# Patient Record
Sex: MALE | Race: WHITE | NOT HISPANIC OR LATINO
[De-identification: names, ages, dates, MRNs, and addresses within clinical notes are randomized per-mention and may not be internally consistent; named-entity substitution may affect disease eponyms.]

---
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
## 2018-02-12 ENCOUNTER — APPOINTMENT (OUTPATIENT)
Dept: FAMILY MEDICINE | Facility: CLINIC | Age: 18
End: 2018-02-12
Payer: COMMERCIAL

## 2018-02-12 PROCEDURE — 99213 OFFICE O/P EST LOW 20 MIN: CPT

## 2018-03-14 ENCOUNTER — OTHER (OUTPATIENT)
Age: 18
End: 2018-03-14

## 2018-03-23 ENCOUNTER — APPOINTMENT (OUTPATIENT)
Dept: PEDIATRIC ALLERGY IMMUNOLOGY | Facility: CLINIC | Age: 18
End: 2018-03-23
Payer: COMMERCIAL

## 2018-03-23 ENCOUNTER — LABORATORY RESULT (OUTPATIENT)
Age: 18
End: 2018-03-23

## 2018-03-23 VITALS
HEART RATE: 75 BPM | HEIGHT: 65.98 IN | OXYGEN SATURATION: 97 % | BODY MASS INDEX: 18.8 KG/M2 | SYSTOLIC BLOOD PRESSURE: 121 MMHG | DIASTOLIC BLOOD PRESSURE: 73 MMHG | WEIGHT: 117 LBS

## 2018-03-23 DIAGNOSIS — Z82.49 FAMILY HISTORY OF ISCHEMIC HEART DISEASE AND OTHER DISEASES OF THE CIRCULATORY SYSTEM: ICD-10-CM

## 2018-03-23 DIAGNOSIS — Z80.1 FAMILY HISTORY OF MALIGNANT NEOPLASM OF TRACHEA, BRONCHUS AND LUNG: ICD-10-CM

## 2018-03-23 PROCEDURE — 99245 OFF/OP CONSLTJ NEW/EST HI 55: CPT | Mod: 25

## 2018-03-23 PROCEDURE — 36415 COLL VENOUS BLD VENIPUNCTURE: CPT

## 2018-04-02 LAB
25(OH)D3 SERPL-MCNC: 29.7 NG/ML
ALBUMIN SERPL ELPH-MCNC: 4.8 G/DL
ALP BLD-CCNC: 48 U/L
ALT SERPL-CCNC: 14 U/L
ANION GAP SERPL CALC-SCNC: 15 MMOL/L
APPEARANCE: CLEAR
AST SERPL-CCNC: 22 U/L
BASOPHILS # BLD AUTO: 0.02 K/UL
BASOPHILS NFR BLD AUTO: 0.3 %
BILIRUB SERPL-MCNC: 0.4 MG/DL
BILIRUBIN URINE: NEGATIVE
BLOOD URINE: ABNORMAL
BUN SERPL-MCNC: 12 MG/DL
C TRACH RRNA SPEC QL NAA+PROBE: NOT DETECTED
CALCIUM SERPL-MCNC: 9.7 MG/DL
CHLORIDE SERPL-SCNC: 103 MMOL/L
CHOLEST SERPL-MCNC: 145 MG/DL
CHOLEST/HDLC SERPL: 2.7 RATIO
CO2 SERPL-SCNC: 25 MMOL/L
COLOR: YELLOW
CREAT SERPL-MCNC: 0.8 MG/DL
DHEA-SULFATE, SERUM: 204 UG/DL
EOSINOPHIL # BLD AUTO: 0.09 K/UL
EOSINOPHIL NFR BLD AUTO: 1.4 %
ESTRADIOL SERPL-MCNC: 33 PG/ML
FSH SERPL-MCNC: 6.1 IU/L
GLUCOSE QUALITATIVE U: NEGATIVE MG/DL
GLUCOSE SERPL-MCNC: 94 MG/DL
HAV IGG+IGM SER QL: NONREACTIVE
HBA1C MFR BLD HPLC: 5 %
HBV CORE IGG+IGM SER QL: NONREACTIVE
HBV SURFACE AB SERPL IA-ACNC: 6.8 MIU/ML
HBV SURFACE AG SER QL: NONREACTIVE
HCT VFR BLD CALC: 40.6 %
HCV AB SER QL: NONREACTIVE
HCV S/CO RATIO: 0.18 S/CO
HDLC SERPL-MCNC: 54 MG/DL
HGB BLD-MCNC: 13.3 G/DL
HIV1+2 AB SPEC QL IA.RAPID: NONREACTIVE
IMM GRANULOCYTES NFR BLD AUTO: 0.2 %
KETONES URINE: NEGATIVE
LDLC SERPL CALC-MCNC: 82 MG/DL
LEUKOCYTE ESTERASE URINE: NEGATIVE
LH SERPL-ACNC: 5.2 IU/L
LYMPHOCYTES # BLD AUTO: 2.06 K/UL
LYMPHOCYTES NFR BLD AUTO: 32.3 %
MAN DIFF?: NORMAL
MCHC RBC-ENTMCNC: 28.9 PG
MCHC RBC-ENTMCNC: 32.8 GM/DL
MCV RBC AUTO: 88.3 FL
MONOCYTES # BLD AUTO: 0.56 K/UL
MONOCYTES NFR BLD AUTO: 8.8 %
N GONORRHOEA RRNA SPEC QL NAA+PROBE: NOT DETECTED
NEUTROPHILS # BLD AUTO: 3.64 K/UL
NEUTROPHILS NFR BLD AUTO: 57 %
NITRITE URINE: NEGATIVE
PH URINE: 5.5
PLATELET # BLD AUTO: 207 K/UL
POTASSIUM SERPL-SCNC: 4.1 MMOL/L
PROT SERPL-MCNC: 7.7 G/DL
PROTEIN URINE: NEGATIVE MG/DL
RBC # BLD: 4.6 M/UL
RBC # FLD: 14 %
SODIUM SERPL-SCNC: 143 MMOL/L
SOURCE AMPLIFICATION: NORMAL
SPECIFIC GRAVITY URINE: 1.03
T PALLIDUM AB SER QL IA: NEGATIVE
TESTOST SERPL-MCNC: 35.2 NG/DL
TRIGL SERPL-MCNC: 46 MG/DL
TSH SERPL-ACNC: 2.29 UIU/ML
UROBILINOGEN URINE: NEGATIVE MG/DL
WBC # FLD AUTO: 6.38 K/UL

## 2018-04-11 ENCOUNTER — OTHER (OUTPATIENT)
Age: 18
End: 2018-04-11

## 2018-04-23 ENCOUNTER — OTHER (OUTPATIENT)
Age: 18
End: 2018-04-23

## 2018-04-30 ENCOUNTER — OTHER (OUTPATIENT)
Age: 18
End: 2018-04-30

## 2018-05-21 ENCOUNTER — TRANSCRIPTION ENCOUNTER (OUTPATIENT)
Age: 18
End: 2018-05-21

## 2018-05-25 ENCOUNTER — OTHER (OUTPATIENT)
Age: 18
End: 2018-05-25

## 2018-06-07 ENCOUNTER — OTHER (OUTPATIENT)
Age: 18
End: 2018-06-07

## 2018-06-11 ENCOUNTER — APPOINTMENT (OUTPATIENT)
Dept: PEDIATRIC ALLERGY IMMUNOLOGY | Facility: CLINIC | Age: 18
End: 2018-06-11
Payer: COMMERCIAL

## 2018-06-11 VITALS — SYSTOLIC BLOOD PRESSURE: 135 MMHG | DIASTOLIC BLOOD PRESSURE: 75 MMHG | HEART RATE: 90 BPM | WEIGHT: 120.39 LBS

## 2018-06-11 DIAGNOSIS — L70.9 ACNE, UNSPECIFIED: ICD-10-CM

## 2018-06-11 DIAGNOSIS — F39 UNSPECIFIED MOOD [AFFECTIVE] DISORDER: ICD-10-CM

## 2018-06-11 DIAGNOSIS — F32.9 MAJOR DEPRESSIVE DISORDER, SINGLE EPISODE, UNSPECIFIED: ICD-10-CM

## 2018-06-11 PROCEDURE — 99215 OFFICE O/P EST HI 40 MIN: CPT

## 2018-06-11 PROCEDURE — 90792 PSYCH DIAG EVAL W/MED SRVCS: CPT

## 2018-06-18 ENCOUNTER — OTHER (OUTPATIENT)
Age: 18
End: 2018-06-18

## 2018-06-18 ENCOUNTER — APPOINTMENT (OUTPATIENT)
Dept: ENDOCRINOLOGY | Facility: CLINIC | Age: 18
End: 2018-06-18
Payer: COMMERCIAL

## 2018-06-18 VITALS
OXYGEN SATURATION: 100 % | HEART RATE: 112 BPM | RESPIRATION RATE: 17 BRPM | BODY MASS INDEX: 19.99 KG/M2 | DIASTOLIC BLOOD PRESSURE: 60 MMHG | HEIGHT: 65 IN | SYSTOLIC BLOOD PRESSURE: 130 MMHG | WEIGHT: 120 LBS

## 2018-06-18 DIAGNOSIS — Z86.69 PERSONAL HISTORY OF OTHER DISEASES OF THE NERVOUS SYSTEM AND SENSE ORGANS: ICD-10-CM

## 2018-06-18 DIAGNOSIS — Z87.09 PERSONAL HISTORY OF OTHER DISEASES OF THE RESPIRATORY SYSTEM: ICD-10-CM

## 2018-06-18 DIAGNOSIS — F33.3 MAJOR DEPRESSIVE DISORDER, RECURRENT, SEVERE WITH PSYCHOTIC SYMPTOMS: ICD-10-CM

## 2018-06-18 PROCEDURE — 96372 THER/PROPH/DIAG INJ SC/IM: CPT

## 2018-06-18 PROCEDURE — 99205 OFFICE O/P NEW HI 60 MIN: CPT | Mod: 25

## 2018-06-18 RX ORDER — ARIPIPRAZOLE 5 MG/1
5 TABLET ORAL
Refills: 2 | Status: ACTIVE | COMMUNITY
Start: 2017-07-03

## 2018-06-25 ENCOUNTER — OTHER (OUTPATIENT)
Age: 18
End: 2018-06-25

## 2018-06-28 PROBLEM — L70.9 ACNE: Status: ACTIVE | Noted: 2018-06-28

## 2018-06-29 ENCOUNTER — OTHER (OUTPATIENT)
Age: 18
End: 2018-06-29

## 2018-07-02 ENCOUNTER — RX RENEWAL (OUTPATIENT)
Age: 18
End: 2018-07-02

## 2018-07-02 ENCOUNTER — APPOINTMENT (OUTPATIENT)
Dept: ENDOCRINOLOGY | Facility: CLINIC | Age: 18
End: 2018-07-02
Payer: COMMERCIAL

## 2018-07-02 PROCEDURE — 96372 THER/PROPH/DIAG INJ SC/IM: CPT

## 2018-07-02 RX ORDER — TESTOSTERONE CYPIONATE 200 MG/ML
200 INJECTION, SOLUTION INTRAMUSCULAR
Refills: 0 | Status: COMPLETED | COMMUNITY
Start: 2018-06-18 | End: 2018-07-02

## 2018-07-02 RX ORDER — TESTOSTERONE ENANTHATE 200 MG/ML
200 INJECTION, SOLUTION INTRAMUSCULAR
Refills: 0 | Status: COMPLETED | OUTPATIENT
Start: 2018-07-02

## 2018-07-02 RX ADMIN — TESTOSTERONE ENANTHATE 0 MG/ML: 200 INJECTION, SOLUTION INTRAMUSCULAR at 00:00

## 2018-07-12 ENCOUNTER — APPOINTMENT (OUTPATIENT)
Dept: FAMILY MEDICINE | Facility: CLINIC | Age: 18
End: 2018-07-12
Payer: COMMERCIAL

## 2018-07-12 VITALS
DIASTOLIC BLOOD PRESSURE: 70 MMHG | WEIGHT: 120 LBS | SYSTOLIC BLOOD PRESSURE: 120 MMHG | BODY MASS INDEX: 19.99 KG/M2 | HEIGHT: 65 IN

## 2018-07-12 DIAGNOSIS — H61.23 IMPACTED CERUMEN, BILATERAL: ICD-10-CM

## 2018-07-12 PROCEDURE — 99213 OFFICE O/P EST LOW 20 MIN: CPT

## 2018-07-24 ENCOUNTER — APPOINTMENT (OUTPATIENT)
Dept: ENDOCRINOLOGY | Facility: CLINIC | Age: 18
End: 2018-07-24
Payer: COMMERCIAL

## 2018-07-24 ENCOUNTER — LABORATORY RESULT (OUTPATIENT)
Age: 18
End: 2018-07-24

## 2018-07-24 VITALS
DIASTOLIC BLOOD PRESSURE: 60 MMHG | BODY MASS INDEX: 19.99 KG/M2 | HEART RATE: 84 BPM | WEIGHT: 120 LBS | SYSTOLIC BLOOD PRESSURE: 110 MMHG | HEIGHT: 65 IN | RESPIRATION RATE: 16 BRPM | OXYGEN SATURATION: 100 %

## 2018-07-24 PROCEDURE — 99214 OFFICE O/P EST MOD 30 MIN: CPT

## 2018-07-26 ENCOUNTER — RX RENEWAL (OUTPATIENT)
Age: 18
End: 2018-07-26

## 2018-08-07 LAB
ALBUMIN SERPL ELPH-MCNC: 4.7 G/DL
ALP BLD-CCNC: 48 U/L
ALT SERPL-CCNC: 12 U/L
ANION GAP SERPL CALC-SCNC: 11 MMOL/L
AST SERPL-CCNC: 19 U/L
BASOPHILS # BLD AUTO: 0.03 K/UL
BASOPHILS NFR BLD AUTO: 0.6 %
BILIRUB SERPL-MCNC: 0.5 MG/DL
BUN SERPL-MCNC: 9 MG/DL
CALCIUM SERPL-MCNC: 9.4 MG/DL
CHLORIDE SERPL-SCNC: 103 MMOL/L
CO2 SERPL-SCNC: 29 MMOL/L
CREAT SERPL-MCNC: 0.74 MG/DL
EOSINOPHIL # BLD AUTO: 0.1 K/UL
EOSINOPHIL NFR BLD AUTO: 1.9 %
ESTRADIOL SERPL-MCNC: 39 PG/ML
GLUCOSE SERPL-MCNC: 90 MG/DL
HCT VFR BLD CALC: 42.6 %
HGB BLD-MCNC: 13.1 G/DL
IMM GRANULOCYTES NFR BLD AUTO: 0 %
LYMPHOCYTES # BLD AUTO: 1.87 K/UL
LYMPHOCYTES NFR BLD AUTO: 35.1 %
MAN DIFF?: NORMAL
MCHC RBC-ENTMCNC: 28 PG
MCHC RBC-ENTMCNC: 30.8 GM/DL
MCV RBC AUTO: 91 FL
MONOCYTES # BLD AUTO: 0.6 K/UL
MONOCYTES NFR BLD AUTO: 11.3 %
NEUTROPHILS # BLD AUTO: 2.73 K/UL
NEUTROPHILS NFR BLD AUTO: 51.1 %
PLATELET # BLD AUTO: 238 K/UL
POTASSIUM SERPL-SCNC: 4.7 MMOL/L
PROT SERPL-MCNC: 7.5 G/DL
RBC # BLD: 4.68 M/UL
RBC # FLD: 13.8 %
SODIUM SERPL-SCNC: 143 MMOL/L
TESTOST SERPL-MCNC: 476.5 NG/DL
WBC # FLD AUTO: 5.33 K/UL

## 2018-08-31 ENCOUNTER — MEDICATION RENEWAL (OUTPATIENT)
Age: 18
End: 2018-08-31

## 2018-09-06 ENCOUNTER — APPOINTMENT (OUTPATIENT)
Dept: PLASTIC SURGERY | Facility: CLINIC | Age: 18
End: 2018-09-06
Payer: COMMERCIAL

## 2018-09-06 VITALS
DIASTOLIC BLOOD PRESSURE: 72 MMHG | OXYGEN SATURATION: 99 % | HEART RATE: 63 BPM | BODY MASS INDEX: 20.83 KG/M2 | HEIGHT: 65 IN | WEIGHT: 125 LBS | RESPIRATION RATE: 16 BRPM | SYSTOLIC BLOOD PRESSURE: 113 MMHG

## 2018-09-06 PROCEDURE — 99204 OFFICE O/P NEW MOD 45 MIN: CPT

## 2018-09-13 ENCOUNTER — OTHER (OUTPATIENT)
Age: 18
End: 2018-09-13

## 2018-09-19 ENCOUNTER — OTHER (OUTPATIENT)
Age: 18
End: 2018-09-19

## 2018-09-19 NOTE — ED PROVIDER NOTE - NSCAREINITIATED _GEN_ER
HPI     Last Eye Exam: 2017 Dr. Philippe at [facility] (no longer   open).    Annual Eyemed Vision exam and CLfit  Blur at distance uncorrected.  Declined dilation due to pregnancy.  Had trial of rachid didn't like  Most brands of CLs uncomfortable in right eye    SYMPTOMS:  (--) Eye pain/discomfort  (--) Blurry Vision  (--) Double Vision  (--) Itchy Eyes  (+) Watery Eyes: only around scented candles.  (+) Dry Eyes: od only  (+) Floaters/Black Spots: for years.  (+) Headaches: neck or right temporal side of head.  (+) Photophobia  ---------------------------------------------------------------------------    EYE MEDS:  otc drops for dry eye relief prn od only  ---------------------------------------------------------------------------    LENSES:  Glasses: SVLs  Contacts: Daily lenses      Last edited by Braydon Rodriguez, OD on 9/19/2018  9:44 AM. (History)            Assessment /Plan     For exam results, see Encounter Report.    Myopia of both eyes  Eyeglass Final Rx     Eyeglass Final Rx       Sphere Cylinder [place] [facility]    Right -2.00 +0.50 040 20/20    Left -1.75 +0.75 110 20/20    Type:  SVL    Expiration Date:  9/20/2019              Trial #2 with 1 wk PHREV    RTC 1 yr                  Bryon Winston(NP)

## 2018-09-24 ENCOUNTER — CHART COPY (OUTPATIENT)
Age: 18
End: 2018-09-24

## 2018-09-25 ENCOUNTER — APPOINTMENT (OUTPATIENT)
Dept: ENDOCRINOLOGY | Facility: CLINIC | Age: 18
End: 2018-09-25
Payer: COMMERCIAL

## 2018-09-25 VITALS
OXYGEN SATURATION: 98 % | SYSTOLIC BLOOD PRESSURE: 120 MMHG | HEIGHT: 65 IN | HEART RATE: 68 BPM | BODY MASS INDEX: 20.83 KG/M2 | WEIGHT: 125 LBS | DIASTOLIC BLOOD PRESSURE: 80 MMHG

## 2018-09-25 PROCEDURE — 99214 OFFICE O/P EST MOD 30 MIN: CPT

## 2018-09-25 RX ORDER — PAROXETINE HYDROCHLORIDE 20 MG/1
20 TABLET, FILM COATED ORAL DAILY
Qty: 30 | Refills: 0 | Status: COMPLETED | COMMUNITY
Start: 2017-07-22 | End: 2018-09-25

## 2018-09-25 RX ORDER — TESTOSTERONE CYPIONATE 100 MG/ML
100 INJECTION, SOLUTION INTRAMUSCULAR
Refills: 0 | Status: COMPLETED | COMMUNITY
End: 2018-09-25

## 2018-09-25 RX ORDER — ARIPIPRAZOLE 5 MG/1
5 TABLET ORAL
Refills: 0 | Status: COMPLETED | COMMUNITY
End: 2018-09-25

## 2018-09-27 ENCOUNTER — OTHER (OUTPATIENT)
Age: 18
End: 2018-09-27

## 2018-10-03 ENCOUNTER — MEDICATION RENEWAL (OUTPATIENT)
Age: 18
End: 2018-10-03

## 2018-10-10 ENCOUNTER — OTHER (OUTPATIENT)
Age: 18
End: 2018-10-10

## 2018-10-12 ENCOUNTER — OTHER (OUTPATIENT)
Age: 18
End: 2018-10-12

## 2018-10-15 ENCOUNTER — OTHER (OUTPATIENT)
Age: 18
End: 2018-10-15

## 2018-10-23 ENCOUNTER — OTHER (OUTPATIENT)
Age: 18
End: 2018-10-23

## 2018-10-29 ENCOUNTER — OTHER (OUTPATIENT)
Age: 18
End: 2018-10-29

## 2018-11-15 ENCOUNTER — APPOINTMENT (OUTPATIENT)
Dept: PLASTIC SURGERY | Facility: CLINIC | Age: 18
End: 2018-11-15
Payer: COMMERCIAL

## 2018-11-15 VITALS
SYSTOLIC BLOOD PRESSURE: 94 MMHG | BODY MASS INDEX: 21.33 KG/M2 | HEART RATE: 74 BPM | HEIGHT: 65 IN | RESPIRATION RATE: 16 BRPM | DIASTOLIC BLOOD PRESSURE: 61 MMHG | OXYGEN SATURATION: 100 % | WEIGHT: 128 LBS

## 2018-11-15 PROCEDURE — 99214 OFFICE O/P EST MOD 30 MIN: CPT

## 2018-11-27 ENCOUNTER — OTHER (OUTPATIENT)
Age: 18
End: 2018-11-27

## 2018-11-29 ENCOUNTER — OUTPATIENT (OUTPATIENT)
Dept: OUTPATIENT SERVICES | Facility: HOSPITAL | Age: 18
LOS: 1 days | End: 2018-11-29

## 2018-11-29 ENCOUNTER — APPOINTMENT (OUTPATIENT)
Dept: SURGICAL ONCOLOGY | Facility: CLINIC | Age: 18
End: 2018-11-29
Payer: COMMERCIAL

## 2018-11-29 VITALS
SYSTOLIC BLOOD PRESSURE: 102 MMHG | OXYGEN SATURATION: 98 % | TEMPERATURE: 98 F | WEIGHT: 128.09 LBS | DIASTOLIC BLOOD PRESSURE: 64 MMHG | HEIGHT: 66.5 IN | RESPIRATION RATE: 14 BRPM | HEART RATE: 89 BPM

## 2018-11-29 DIAGNOSIS — F64.0 TRANSSEXUALISM: ICD-10-CM

## 2018-11-29 DIAGNOSIS — F20.9 SCHIZOPHRENIA, UNSPECIFIED: ICD-10-CM

## 2018-11-29 DIAGNOSIS — F32.9 MAJOR DEPRESSIVE DISORDER, SINGLE EPISODE, UNSPECIFIED: ICD-10-CM

## 2018-11-29 DIAGNOSIS — N63.0 UNSPECIFIED LUMP IN UNSPECIFIED BREAST: ICD-10-CM

## 2018-11-29 LAB
HCG SERPL-ACNC: < 5 MIU/ML — SIGNIFICANT CHANGE UP
HCT VFR BLD CALC: 44.8 % — SIGNIFICANT CHANGE UP (ref 34.5–45)
HGB BLD-MCNC: 14.1 G/DL — SIGNIFICANT CHANGE UP (ref 11.5–15.5)
MCHC RBC-ENTMCNC: 26.6 PG — LOW (ref 27–34)
MCHC RBC-ENTMCNC: 31.5 % — LOW (ref 32–36)
MCV RBC AUTO: 84.4 FL — SIGNIFICANT CHANGE UP (ref 80–100)
NRBC # FLD: 0 — SIGNIFICANT CHANGE UP
PLATELET # BLD AUTO: 219 K/UL — SIGNIFICANT CHANGE UP (ref 150–400)
PMV BLD: 10.3 FL — SIGNIFICANT CHANGE UP (ref 7–13)
RBC # BLD: 5.31 M/UL — HIGH (ref 3.8–5.2)
RBC # FLD: 14.6 % — HIGH (ref 10.3–14.5)
WBC # BLD: 4.97 K/UL — SIGNIFICANT CHANGE UP (ref 3.8–10.5)
WBC # FLD AUTO: 4.97 K/UL — SIGNIFICANT CHANGE UP (ref 3.8–10.5)

## 2018-11-29 PROCEDURE — 99243 OFF/OP CNSLTJ NEW/EST LOW 30: CPT

## 2018-11-29 NOTE — H&P PST ADULT - MUSCULOSKELETAL
detailed exam no joint swelling/ROM intact/no joint warmth/no calf tenderness/normal strength/no joint erythema negative

## 2018-11-29 NOTE — H&P PST ADULT - HISTORY OF PRESENT ILLNESS
16yo female with medical h/o Schizophrenic disorder, Depression, and Gender identity disorder, . Pt presents today for presurgical testing for Bilateral Mastectomy, Bilateral Nipple Reconstruction with Liposuction scheduled for 12/19/2018.

## 2018-11-29 NOTE — H&P PST ADULT - PMH
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

## 2018-11-29 NOTE — H&P PST ADULT - NSANTHOSAYNRD_GEN_A_CORE
No. JANNY screening performed.  STOP BANG Legend: 0-2 = LOW Risk; 3-4 = INTERMEDIATE Risk; 5-8 = HIGH Risk

## 2018-11-29 NOTE — H&P PST ADULT - ATTENDING COMMENTS
Patient met in the pre-operative holding area, with mother and father present.  Operative plan reviewed: bilateral breast liposuction, mastectomy, and nipple repositioning.  Risks, benefits, and alternatives were discussed including the risks of infection, bleeding, seroma, hematoma, nipple necrosis, asymmetry, need for revision surgery, loss of nipple sensation, decrease/loss of sensation of breast skin, dehiscence.  Patient and family understand the risks, benefits, and alterniatives, all questions answered, patient wishes to proceed with surgery.  Informed consent obtained and placed on the chart.

## 2018-11-29 NOTE — H&P PST ADULT - PROBLEM SELECTOR PLAN 1
Bilateral Mastectomy, Bilateral Nipple Reconstruction with Liposuction scheduled for 12/19/2018. Bilateral Mastectomy, Bilateral Nipple Reconstruction with Liposuction scheduled for 12/19/2018.  Pre-op instructions given. Pt verbalized understanding  Pepcid given for GI prophylaxis  Chlorhexidine wash instructions given  UCG ordered STAT for day of procedure - urine container given

## 2018-12-17 ENCOUNTER — OTHER (OUTPATIENT)
Age: 18
End: 2018-12-17

## 2018-12-17 ENCOUNTER — APPOINTMENT (OUTPATIENT)
Dept: ENDOCRINOLOGY | Facility: CLINIC | Age: 18
End: 2018-12-17
Payer: COMMERCIAL

## 2018-12-17 VITALS
HEART RATE: 74 BPM | WEIGHT: 128 LBS | OXYGEN SATURATION: 99 % | DIASTOLIC BLOOD PRESSURE: 60 MMHG | SYSTOLIC BLOOD PRESSURE: 98 MMHG

## 2018-12-17 PROBLEM — F32.9 MAJOR DEPRESSIVE DISORDER, SINGLE EPISODE, UNSPECIFIED: Chronic | Status: ACTIVE | Noted: 2018-11-29

## 2018-12-17 PROBLEM — F64.0 TRANSSEXUALISM: Chronic | Status: ACTIVE | Noted: 2018-11-29

## 2018-12-17 PROBLEM — F20.9 SCHIZOPHRENIA, UNSPECIFIED: Chronic | Status: ACTIVE | Noted: 2018-11-29

## 2018-12-17 PROCEDURE — 99214 OFFICE O/P EST MOD 30 MIN: CPT

## 2018-12-18 ENCOUNTER — TRANSCRIPTION ENCOUNTER (OUTPATIENT)
Age: 18
End: 2018-12-18

## 2018-12-18 ENCOUNTER — APPOINTMENT (OUTPATIENT)
Dept: PLASTIC SURGERY | Facility: CLINIC | Age: 18
End: 2018-12-18

## 2018-12-19 ENCOUNTER — RESULT REVIEW (OUTPATIENT)
Age: 18
End: 2018-12-19

## 2018-12-19 ENCOUNTER — OUTPATIENT (OUTPATIENT)
Dept: OUTPATIENT SERVICES | Facility: HOSPITAL | Age: 18
LOS: 1 days | Discharge: HOME | End: 2018-12-19
Payer: COMMERCIAL

## 2018-12-19 VITALS
SYSTOLIC BLOOD PRESSURE: 125 MMHG | OXYGEN SATURATION: 99 % | HEART RATE: 87 BPM | RESPIRATION RATE: 14 BRPM | TEMPERATURE: 98 F | DIASTOLIC BLOOD PRESSURE: 82 MMHG

## 2018-12-19 VITALS
TEMPERATURE: 98 F | OXYGEN SATURATION: 100 % | HEART RATE: 103 BPM | RESPIRATION RATE: 14 BRPM | DIASTOLIC BLOOD PRESSURE: 62 MMHG | HEIGHT: 66.5 IN | WEIGHT: 128.09 LBS | SYSTOLIC BLOOD PRESSURE: 121 MMHG

## 2018-12-19 DIAGNOSIS — F64.0 TRANSSEXUALISM: ICD-10-CM

## 2018-12-19 PROCEDURE — 88307 TISSUE EXAM BY PATHOLOGIST: CPT | Mod: 26

## 2018-12-19 PROCEDURE — 19303 MAST SIMPLE COMPLETE: CPT | Mod: 50

## 2018-12-19 PROCEDURE — 15877 SUCTION LIPECTOMY TRUNK: CPT

## 2018-12-19 PROCEDURE — 19350 NIPPLE/AREOLA RECONSTRUCTION: CPT | Mod: 50

## 2018-12-19 RX ORDER — OXYCODONE HYDROCHLORIDE 5 MG/1
1 TABLET ORAL
Qty: 20 | Refills: 0
Start: 2018-12-19 | End: 2018-12-22

## 2018-12-19 NOTE — ASU DISCHARGE PLAN (ADULT/PEDIATRIC). - NOTIFY
Fever greater than 101/Unable to Urinate/Pain not relieved by Medications/Bleeding that does not stop/Swelling that continues/Persistent Nausea and Vomiting

## 2018-12-19 NOTE — ASU DISCHARGE PLAN (ADULT/PEDIATRIC). - MEDICATION SUMMARY - MEDICATIONS TO TAKE
I will START or STAY ON the medications listed below when I get home from the hospital:    oxyCODONE 5 mg oral tablet  -- 1 tab(s) by mouth every 4-6 hours, As needed, for breakthrough pain Moderate Pain (4 - 6) MDD 6  -- Indication: For Pain    Paxil 20 mg oral tablet  -- 1 tab(s) by mouth once a day  -- Indication: For Home medication    Abilify  -- 7.5mg   -- Indication: For Home medication    testosterone  -- every 2 weeks  -- Indication: For Home medication

## 2018-12-19 NOTE — BRIEF OPERATIVE NOTE - PROCEDURE
<<-----Click on this checkbox to enter Procedure Nipple reconstruction  12/19/2018    Active  TCOOK4  Liposuction  12/19/2018    Active  TCOOK4  Bilateral mastectomy  12/19/2018    Active  TCOOK4

## 2018-12-19 NOTE — ASU DISCHARGE PLAN (ADULT/PEDIATRIC). - SPECIAL INSTRUCTIONS
WOUND CARE. Keep chest binder on for compression support. You have steri strips around your nipples, these will fall off on their own or be removed in office at your follow up appointment. You will be discharged with ERWIN drains. You will need to empty them and record outputs accurately. This will be taught to you by the nursing staff. Please do not remove the ERWIN drains. They will be removed in the office. Please bring to the office accurate records of output.     Please do not soak in baths/bodies of water until your incisions are healed. You may shower, let water run over incisions. Pat dry, and replace binder back on for support.

## 2018-12-19 NOTE — ASU DISCHARGE PLAN (ADULT/PEDIATRIC). - ITEMS TO FOLLOWUP WITH YOUR PHYSICIAN'S
Please follow up with Dr. Pena within x1 week after discharge from the hospital. You may call [phone] to schedule an appointment.   For pain, please take Tylenol and Motrin, a prescription was sent to the pharmacy for Oxycodone, you can take these as prescribed for breakthrough pain.

## 2018-12-19 NOTE — BRIEF OPERATIVE NOTE - PRE-OP DX
Encounter for breast reconstruction following mastectomy  12/19/2018  Elected mastectomy with nipple reconstruction and liposuction  Active  Ivy Walters

## 2018-12-27 ENCOUNTER — APPOINTMENT (OUTPATIENT)
Dept: PLASTIC SURGERY | Facility: CLINIC | Age: 18
End: 2018-12-27

## 2018-12-27 VITALS
SYSTOLIC BLOOD PRESSURE: 111 MMHG | WEIGHT: 128 LBS | OXYGEN SATURATION: 100 % | RESPIRATION RATE: 16 BRPM | HEART RATE: 109 BPM | BODY MASS INDEX: 21.33 KG/M2 | DIASTOLIC BLOOD PRESSURE: 78 MMHG | HEIGHT: 65 IN

## 2018-12-28 LAB — SURGICAL PATHOLOGY STUDY: SIGNIFICANT CHANGE UP

## 2019-01-08 ENCOUNTER — APPOINTMENT (OUTPATIENT)
Dept: PLASTIC SURGERY | Facility: CLINIC | Age: 19
End: 2019-01-08
Payer: COMMERCIAL

## 2019-01-08 VITALS
OXYGEN SATURATION: 100 % | TEMPERATURE: 97.8 F | BODY MASS INDEX: 20.99 KG/M2 | DIASTOLIC BLOOD PRESSURE: 73 MMHG | HEIGHT: 65 IN | RESPIRATION RATE: 16 BRPM | HEART RATE: 82 BPM | SYSTOLIC BLOOD PRESSURE: 125 MMHG | WEIGHT: 126 LBS

## 2019-01-08 PROCEDURE — 99024 POSTOP FOLLOW-UP VISIT: CPT

## 2019-01-17 ENCOUNTER — APPOINTMENT (OUTPATIENT)
Dept: PLASTIC SURGERY | Facility: CLINIC | Age: 19
End: 2019-01-17

## 2019-01-17 VITALS
SYSTOLIC BLOOD PRESSURE: 114 MMHG | HEART RATE: 72 BPM | RESPIRATION RATE: 16 BRPM | OXYGEN SATURATION: 99 % | WEIGHT: 126 LBS | HEIGHT: 65 IN | DIASTOLIC BLOOD PRESSURE: 72 MMHG | BODY MASS INDEX: 20.99 KG/M2

## 2019-01-17 NOTE — HISTORY OF PRESENT ILLNESS
[FreeTextEntry1] : 18 year old transgender male presents s/p top surgery on 12/19/18, presents with a compression binder.\par \par He reports his wounds are slowly healing.

## 2019-01-17 NOTE — ADDENDUM
[FreeTextEntry1] : I, Rohini Malhotra, acted solely as a scribe for Dr. Yan Pena on this date 1/17/19.

## 2019-01-17 NOTE — PHYSICAL EXAM
[NI] : Normal [de-identified] : Bilateral incisions healing well, no evidence of infection. Wound closing slowly. Wet to dry dressing applied.\par Nipples in good position.\par HB was present during exam.

## 2019-01-24 ENCOUNTER — APPOINTMENT (OUTPATIENT)
Dept: PLASTIC SURGERY | Facility: CLINIC | Age: 19
End: 2019-01-24

## 2019-01-24 VITALS
HEIGHT: 65 IN | SYSTOLIC BLOOD PRESSURE: 123 MMHG | DIASTOLIC BLOOD PRESSURE: 79 MMHG | WEIGHT: 128 LBS | BODY MASS INDEX: 21.33 KG/M2 | HEART RATE: 95 BPM | OXYGEN SATURATION: 99 %

## 2019-01-24 NOTE — ADDENDUM
[FreeTextEntry1] : I, Rohini Malhotra, acted solely as a scribe for Dr. Yan Pena on this date 1/24/19.

## 2019-01-24 NOTE — PHYSICAL EXAM
[NI] : Normal [de-identified] : Bilateral incisions healing well, no evidence of infection. Wound closing slowly. Antibiotic ointment with bandage applied.\par Nipples in good position.\par HB was present during exam.

## 2019-02-14 ENCOUNTER — RX RENEWAL (OUTPATIENT)
Age: 19
End: 2019-02-14

## 2019-02-21 ENCOUNTER — APPOINTMENT (OUTPATIENT)
Dept: PLASTIC SURGERY | Facility: CLINIC | Age: 19
End: 2019-02-21

## 2019-02-21 VITALS
WEIGHT: 125 LBS | HEIGHT: 65 IN | HEART RATE: 76 BPM | BODY MASS INDEX: 20.83 KG/M2 | SYSTOLIC BLOOD PRESSURE: 114 MMHG | RESPIRATION RATE: 17 BRPM | DIASTOLIC BLOOD PRESSURE: 65 MMHG

## 2019-02-21 NOTE — ASSESSMENT
[FreeTextEntry1] : 17 y/o transgender male s/p top surgery on 12/19/18. The patient is doing well. Scar massage technique was demonstrated to the patient and recommended for 5 minutes BID q 6 weeks to smooth out his scars. Return in six months for long term follow up.

## 2019-02-21 NOTE — ADDENDUM
[FreeTextEntry1] : I, Rohini Malhotra, acted solely as a scribe for Dr. Yan Pena on this date 2/21/19.

## 2019-02-21 NOTE — HISTORY OF PRESENT ILLNESS
[FreeTextEntry1] : 18 year old transgender male presents s/p top surgery on 12/19/18.\par \par He reports mild dehiscence in his left upper areolar scar. He denies drainage from the site.

## 2019-03-11 ENCOUNTER — RX RENEWAL (OUTPATIENT)
Age: 19
End: 2019-03-11

## 2019-03-15 ENCOUNTER — APPOINTMENT (OUTPATIENT)
Dept: FAMILY MEDICINE | Facility: CLINIC | Age: 19
End: 2019-03-15
Payer: COMMERCIAL

## 2019-03-15 VITALS
DIASTOLIC BLOOD PRESSURE: 80 MMHG | RESPIRATION RATE: 16 BRPM | WEIGHT: 128 LBS | OXYGEN SATURATION: 98 % | HEART RATE: 81 BPM | SYSTOLIC BLOOD PRESSURE: 120 MMHG | HEIGHT: 65 IN | BODY MASS INDEX: 21.33 KG/M2

## 2019-03-15 DIAGNOSIS — Z87.892 PERSONAL HISTORY OF ANAPHYLAXIS: ICD-10-CM

## 2019-03-15 PROCEDURE — 99213 OFFICE O/P EST LOW 20 MIN: CPT

## 2019-03-15 RX ORDER — ARIPIPRAZOLE 5 MG/1
5 TABLET ORAL
Refills: 0 | Status: DISCONTINUED | COMMUNITY
End: 2019-03-15

## 2019-03-15 NOTE — PLAN
[FreeTextEntry1] : Recent labwork performed at specialist.  \par Epipen renewed.  \par \par Asked patient to have recent notes from Psychiatrist sent to office. \par \par Patient expressed understanding. \par \par

## 2019-03-15 NOTE — HISTORY OF PRESENT ILLNESS
[Parent] : parent [FreeTextEntry1] : Here for follow-up  [de-identified] : Here for follow-up, requires refill on epi pen for severe sesame and seed allergy.  Has not had allergic reaction since age 14, required hospital eval at that time. \par \par Just had bloodwork performed with endocrine at end of January, CBC, CMP reviewed.  \par Follows with Endocrine, Psych every 2 months, Psychology weekly, Plastic Surgery.  Has upcoming endocrinology appointment, following testosterone levels. \par \par Paxil dosage changed to 15mg two months ago.\par \par Medications and allergies reviewed. \par Patient reports doing well.   \par

## 2019-03-15 NOTE — PHYSICAL EXAM
[No Acute Distress] : no acute distress [Well Nourished] : well nourished [Well Developed] : well developed [Well-Appearing] : well-appearing [Normal Oropharynx] : the oropharynx was normal [Normal TMs] : both tympanic membranes were normal [Supple] : supple [No Lymphadenopathy] : no lymphadenopathy [No Respiratory Distress] : no respiratory distress  [Clear to Auscultation] : lungs were clear to auscultation bilaterally [No Accessory Muscle Use] : no accessory muscle use [Normal Rate] : normal rate  [Regular Rhythm] : with a regular rhythm [Normal S1, S2] : normal S1 and S2 [No Edema] : there was no peripheral edema [No Extremity Clubbing/Cyanosis] : no extremity clubbing/cyanosis [Soft] : abdomen soft [Non Tender] : non-tender [Non-distended] : non-distended [No Masses] : no abdominal mass palpated [Normal Bowel Sounds] : normal bowel sounds [Normal Affect] : the affect was normal [Alert and Oriented x3] : oriented to person, place, and time [Normal Mood] : the mood was normal

## 2019-04-18 ENCOUNTER — TRANSCRIPTION ENCOUNTER (OUTPATIENT)
Age: 19
End: 2019-04-18

## 2019-04-26 ENCOUNTER — APPOINTMENT (OUTPATIENT)
Dept: OBGYN | Facility: CLINIC | Age: 19
End: 2019-04-26
Payer: COMMERCIAL

## 2019-04-26 VITALS
SYSTOLIC BLOOD PRESSURE: 122 MMHG | HEIGHT: 65 IN | BODY MASS INDEX: 21.58 KG/M2 | HEART RATE: 74 BPM | WEIGHT: 129.5 LBS | OXYGEN SATURATION: 100 % | DIASTOLIC BLOOD PRESSURE: 79 MMHG

## 2019-04-26 PROCEDURE — 99202 OFFICE O/P NEW SF 15 MIN: CPT

## 2019-04-29 NOTE — PHYSICAL EXAM
[Alert] : alert [Awake] : awake [Oriented x3] : oriented to person, place, and time [Acute Distress] : no acute distress [Depressed Mood] : not depressed [Flat Affect] : affect not flat

## 2019-05-06 ENCOUNTER — APPOINTMENT (OUTPATIENT)
Dept: ENDOCRINOLOGY | Facility: CLINIC | Age: 19
End: 2019-05-06
Payer: COMMERCIAL

## 2019-05-06 VITALS
SYSTOLIC BLOOD PRESSURE: 120 MMHG | HEART RATE: 70 BPM | WEIGHT: 128 LBS | HEIGHT: 65 IN | DIASTOLIC BLOOD PRESSURE: 78 MMHG | OXYGEN SATURATION: 98 % | BODY MASS INDEX: 21.33 KG/M2

## 2019-05-06 PROCEDURE — 99214 OFFICE O/P EST MOD 30 MIN: CPT | Mod: GC

## 2019-05-06 NOTE — REVIEW OF SYSTEMS
[Depression] : depression [All other systems negative] : All other systems negative [Recent Weight Gain (___ Lbs)] : no recent weight gain [Fever] : no fever [Recent Weight Loss (___ Lbs)] : no recent weight loss [Chills] : no chills [Dysphonia] : no dysphonia [Dysphagia] : no dysphagia [Blurry Vision] : no blurred vision [Palpitations] : no palpitations [Chest Pain] : no chest pain [Neck Pain] : no neck pain [Lower Ext Edema] : no lower extremity edema [Cough] : no cough [Nausea] : no nausea [Shortness Of Breath] : no shortness of breath [Vomiting] : no vomiting was observed [Constipation] : no constipation [Diarrhea] : no diarrhea [Joint Pain] : no joint pain [Abdominal Pain] : no abdominal pain [Myalgia] : no myalgia  [Anxiety] : no anxiety [Tremors] : no tremors [Headache] : no headaches

## 2019-05-06 NOTE — ASSESSMENT
[FreeTextEntry1] : 19 y/o trans M with gender dysphoria here for hormone male transition- Discussed the treatment regimen of testosterone and risks and benefits. He is aware of risk of polycythemia, CVA, CAD, liver effects, and worsening underlying psychiatric disease possibly with testosterone use. Also discussed benefits such as facial and body hair growth, body fat redistribution, lack of periods, clitorimegaly, deeper voice etc. He is aware the effects can take anywhere from 3-6 months for onset and 2 years for maximum effect. He is satisfied thus far with the effects of testosterone therapy. Now on testosterone 120 mg IM every 2 weeks. Last injection 14 days ago. Will check trough testosterone level today and estradiol level as well and adjust testosterone as needed for goal trough testosterone of 300. He will consider testosterone pellets in the future. Now s/p top surgery with Dr. Pena. Not interested in bottom surgery as of yet, has seen GYN. Will need continued monitoring for cancer screening in the future such as mammography, colonoscopy, and PAP if no TERRELL. No plans for pregnancy. He does not want to entertain egg freezing or fertility options. Mental health follow-up recommended.

## 2019-05-06 NOTE — HISTORY OF PRESENT ILLNESS
[FreeTextEntry1] : PAVITHRA HOWARD is a 18 year old, transmale here for transgender hormone management. \par \par Pronouns: He/ Him / His \par Sexual orientation: mostly straight\par Gender identity: Male\par \par Transition history (Social, Endo, Surgical): When the patient was little, he felt he was a boy. Hated dolls and girls stuff, wanted a penis when he was [age] years old. Interested in doing all masculine things. Identical twin sister (Twin A) is supportive. Always liked and done different things. Sister supportive of the patient's transition. When he was in the women's restroom since starting college realized that he didn't want to be a women any longer. Told older sister months before coming out that he was a delmi. Spoke with parents about coming out first. When he told his mom (early Dec 2017), she intially shut down the conversation and her dad didn't understand (Nov 2017), but they let her come out publically and have "come around' and are supportive. Since then, they are supportive, and call the patient by "Pavithra" but are still mis-gendering the patient with pronouns. Came out publically in early Dec 2017.\par \par Started on testosterone 6/18/18 100 mg every 2 weeks. Now on testosterone 120 mg every 14 days as he was still having menses. Last injection 14 days ago, due for injection today. Injects into buttocks. No issues with injections. Since starting testosterone, has noticed voice changes (deeper voice), bottom growth, some increased hair growth but not as much as he would like, clitoromegaly. Has increased libido. Has also had worsening acne, especially over the shoulders and upper arms. Has been using clindamycin cream. He states that he would like more hair growth and a deeper voice. He has had dysphoria about chest, and has now undergone top surgery 12/2018 periareolar with Dr. Pena and is satisfied with the results. Has seen gyn but deferring hysterectomy at this time and not interested in egg freezing. LMP 3/16/2019. Menses: periods had been regular prior to initiation of testosterone. At this time, he does not have headaches, blurry vision, chest pain, palpitations, abdominal pain, nausea, vomiting, diarrhea, constipation, leg/calf pain, lower extremity edema. \par \par Education: Pt attends [employer], studying music/performance. High school: [employer]. College: [employer], [employer] (composition and tech)\par Coming out/Family support: Mother is supportive, dad is as well.\par  Existing provider team: - GYN: saw Dr. Escobar. - Psychiatrist: Dr. Edwin Bran ([facility] ) Pt has seen provider since June 2017, sessions are monthly. Happy to write letter. - Psychologist: Dr. Sri Quintero ( [facility] ) Pt has seen provider since May 2017, sessions are weekly. Provider is more on board with pt's transition than psychiatrist, according to pt's mother. Pt has more a relationship with Dr. Quintero. - PCP: Gen Anthony Fraire\par Hospital admission: 13 y/o allergic reaction to seseme seeds; pt had tongue swelling and lip swelling and gave himself epipen and got whole body hives. Follow with Dr. De Santiago for allergy. Last seen years ago. 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 Now follows with Dr. Serrano at [facility]. Food Allegy: avoiding tree nuts and all seeds. Also avoids peanuts out of an abundance of caution.Asthma: none Pollen allergies: spring mostly and some in the fall. Sometime take claritin. Helps. Drug Allergy: Amox/PCN - hives over whole body, last at age 3 years old. \par

## 2019-05-06 NOTE — PHYSICAL EXAM
[Alert] : alert [No Acute Distress] : no acute distress [Normal Sclera/Conjunctiva] : normal sclera/conjunctiva [No Proptosis] : no proptosis [No Neck Mass] : no neck mass was observed [Supple] : the neck was supple [Normal Rate and Effort] : normal respiratory rhythm and effort [No Respiratory Distress] : no respiratory distress [Thyroid Not Enlarged] : the thyroid was not enlarged [No Accessory Muscle Use] : no accessory muscle use [Clear to Auscultation] : lungs were clear to auscultation bilaterally [Normal Rate] : heart rate was normal  [Normal S1, S2] : normal S1 and S2 [No Edema] : there was no peripheral edema [Regular Rhythm] : with a regular rhythm [Normal Bowel Sounds] : normal bowel sounds [Not Tender] : non-tender [Soft] : abdomen soft [No Masses] : no abdominal mass palpated [Not Distended] : not distended [Normal Gait] : normal gait [Acne] : acne [Normal Strength/Tone] : muscle strength and tone were normal [No Clubbing, Cyanosis] : no clubbing  or cyanosis of the fingernails [No Tremors] : no tremors [No Motor Deficits] : the motor exam was normal [Normal Insight/Judgement] : insight and judgment were intact [Normal Affect] : the affect was normal [Oriented x3] : oriented to person, place, and time [Normal Mood] : the mood was normal [Well Nourished] : well nourished [Well Developed] : well developed [Acanthosis Nigricans] : no acanthosis nigricans [Abdominal Striae] : no abdominal striae [de-identified] : +b/l mastectomy and periareolar scars healed

## 2019-05-06 NOTE — END OF VISIT
[] : Fellow [FreeTextEntry3] : Agree with assessment and plan as above by Dr. Kathleen.  [>50% of Time Spent on Counseling and Coordination of Care for  ___] : Greater than 50% of the encounter time was spent on counseling and coordination of care for [unfilled] [Time Spent: ___ minutes] : I have spent [unfilled] minutes of face to face time with the patient

## 2019-05-06 NOTE — DATA REVIEWED
[FreeTextEntry1] : Labs 1/2019:\par Testosterone 340.8\par Estradiol 40.93\par CMP normal\par CBC normal\par \par Labs 11/2018:\par Estradiol 54.83\par Testosterone 450\par CBC normal \par CMP normal\par \par Labs 7/24/18:\par Hb 13.1\par CMP normal\par Estradiol 39\par Testosterone 476.5\par \par Labs 3/2018:\par CBC normal\par LDL 82\par HDL 54\par Chol 145\par Trig 46\par CMP normal\par TSH 2.29\par Testosterone 35.2\par Estradiol 33\par Vit D 29.7\par DHEAS 204\par HIV nonreactive

## 2019-05-06 NOTE — HISTORY OF PRESENT ILLNESS
[FreeTextEntry1] : PAVITHRA HOWARD is a 18 year old, transmale here for transgender hormone management. \par \par Pronouns: He/ Him / His \par Sexual orientation: mostly straight\par Gender identity: Male\par Name: Plan to legally change and gender marker change 2018. \par \par Transition history (Social, Endo, Surgical):When the patient was little, he felt he was a boy. Hated dolls and girls stuff, wanted a penis when he was [age] years old. Interested in doing all masculine things. Identical twin sister (Twin A) is supportive. Always liked and done different things. Sister supportive of the patient's transition. When he was in the women's restroom since starting college realized that he didn't want to be a women any longer. Told older sister months before coming out that he was a delmi. Spoke with parents about coming out first. When he told his mom (early Dec 2017), she intially shut down the conversation and her dad didn't understand (2017), but they let her come out publically and have "come around' and are supportive. Since then, they are supportive, and call the patient by "Pavithra" but are still mis-gendering the patient with pronouns. Came out publically in early Dec 2017.\par \par Started on testosterone 16 100 mg every 2 weeks. Now on testosterone 120 mg every 14 days, last injection was 14 days ago. Since increasing dose of testosterone to 120 mg every two weeks, has noticed cessation of menses. Last menstrual 2019 and lasted about 1 week. Injects testosterone into buttocks. No issues with injections. Has noticed voice change, bottom growth, some increased hair growth. Increased libido. Goals are that he wants his body to match what he feels like on the inside. Looking forward to further facial hair and voice change. Dysphoria about chest awaiting top surgery 18 periareolar with Dr. Pena. Still wearing the binder until then. Bindin-10 hrs gc2b halter top black ; does not sleep in it. Tried KT tape once - never again. Not interested in egg freezing. LMP 2018. Menses: periods had been regular. No GYN follow-up. Education: Pt attends [employer], studying ( music/performance ). 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 Existing provider team: - GYN: never - Psychiatrist: Dr. Edwin Bran ( [place], [place] ) Pt has seen provider since 2017, sessions are monthly. Happy to write letter. - Psychologist: Dr. Sri Quintero ( [facility] ) Pt has seen provider since May 2017, sessions are weekly. Provider is more on board with pt's transition than psychiatrist, according to pt's mother. Pt has more a relationship with Dr. Quintero. - [facility] 13 [facility] allergic reaction to seseme seeds; pt had tounge swellign and lip swelling and gave himself epipen and got whole body hives. Follow with Dr. De Santiago for allergy. Last seen years ago. 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 Now follows with Dr. Serrano at [facility]. Food Allegy: avoiding tree nuts and all seeds. Also avoids peanuts out of an abundance of caution.Asthma: nonePollen allergies: spring mostly and some in the fall. Sometime take claritin. Helps. Drug Allergy: Amox/PCN - hives over whole body, last at age 4 y/o

## 2019-05-09 ENCOUNTER — RESULT REVIEW (OUTPATIENT)
Age: 19
End: 2019-05-09

## 2019-05-09 ENCOUNTER — RX RENEWAL (OUTPATIENT)
Age: 19
End: 2019-05-09

## 2019-05-09 LAB
ALBUMIN SERPL ELPH-MCNC: 5 G/DL
ALP BLD-CCNC: 65 U/L
ALT SERPL-CCNC: 15 U/L
ANION GAP SERPL CALC-SCNC: 14 MMOL/L
AST SERPL-CCNC: 21 U/L
BASOPHILS # BLD AUTO: 0.04 K/UL
BASOPHILS NFR BLD AUTO: 0.6 %
BILIRUB SERPL-MCNC: 0.6 MG/DL
BUN SERPL-MCNC: 12 MG/DL
CALCIUM SERPL-MCNC: 9.6 MG/DL
CHLORIDE SERPL-SCNC: 102 MMOL/L
CHOLEST SERPL-MCNC: 136 MG/DL
CHOLEST/HDLC SERPL: 3.1 RATIO
CO2 SERPL-SCNC: 24 MMOL/L
CREAT SERPL-MCNC: 0.85 MG/DL
EOSINOPHIL # BLD AUTO: 0.12 K/UL
EOSINOPHIL NFR BLD AUTO: 1.9 %
ESTRADIOL SERPL-MCNC: 81 PG/ML
GLUCOSE SERPL-MCNC: 90 MG/DL
HCT VFR BLD CALC: 50.6 %
HDLC SERPL-MCNC: 44 MG/DL
HGB BLD-MCNC: 15.5 G/DL
IMM GRANULOCYTES NFR BLD AUTO: 0.3 %
LDLC SERPL CALC-MCNC: 78 MG/DL
LYMPHOCYTES # BLD AUTO: 1.98 K/UL
LYMPHOCYTES NFR BLD AUTO: 32 %
MAN DIFF?: NORMAL
MCHC RBC-ENTMCNC: 27.2 PG
MCHC RBC-ENTMCNC: 30.6 GM/DL
MCV RBC AUTO: 88.8 FL
MONOCYTES # BLD AUTO: 0.66 K/UL
MONOCYTES NFR BLD AUTO: 10.7 %
NEUTROPHILS # BLD AUTO: 3.36 K/UL
NEUTROPHILS NFR BLD AUTO: 54.5 %
PLATELET # BLD AUTO: 252 K/UL
POTASSIUM SERPL-SCNC: 4.7 MMOL/L
PROT SERPL-MCNC: 7.7 G/DL
RBC # BLD: 5.7 M/UL
RBC # FLD: 15 %
SODIUM SERPL-SCNC: 140 MMOL/L
TESTOST SERPL-MCNC: 237 NG/DL
TRIGL SERPL-MCNC: 70 MG/DL
WBC # FLD AUTO: 6.18 K/UL

## 2019-06-25 ENCOUNTER — RX RENEWAL (OUTPATIENT)
Age: 19
End: 2019-06-25

## 2019-07-16 ENCOUNTER — OTHER (OUTPATIENT)
Age: 19
End: 2019-07-16

## 2019-07-18 ENCOUNTER — APPOINTMENT (OUTPATIENT)
Dept: OBGYN | Facility: CLINIC | Age: 19
End: 2019-07-18
Payer: COMMERCIAL

## 2019-07-18 ENCOUNTER — OTHER (OUTPATIENT)
Age: 19
End: 2019-07-18

## 2019-07-18 PROCEDURE — 99243 OFF/OP CNSLTJ NEW/EST LOW 30: CPT

## 2019-08-12 ENCOUNTER — OTHER (OUTPATIENT)
Age: 19
End: 2019-08-12

## 2019-08-12 ENCOUNTER — APPOINTMENT (OUTPATIENT)
Dept: ENDOCRINOLOGY | Facility: CLINIC | Age: 19
End: 2019-08-12
Payer: COMMERCIAL

## 2019-08-12 VITALS
HEART RATE: 76 BPM | WEIGHT: 128 LBS | OXYGEN SATURATION: 98 % | SYSTOLIC BLOOD PRESSURE: 120 MMHG | DIASTOLIC BLOOD PRESSURE: 62 MMHG

## 2019-08-12 PROCEDURE — 99214 OFFICE O/P EST MOD 30 MIN: CPT

## 2019-08-12 NOTE — ASSESSMENT
[FreeTextEntry1] : 17 y/o trans M with gender dysphoria here for hormone male transition- Discussed the treatment regimen of testosterone and risks and benefits. He is aware of risk of polycythemia, CVA, CAD, liver effects, and worsening underlying psychiatric disease possibly with testosterone use. Also discussed benefits such as facial and body hair growth, body fat redistribution, lack of periods, clitorimegaly, deeper voice etc. He is aware the effects can take anywhere from 3-6 months for onset and 2 years for maximum effect. He is satisfied thus far with the effects of testosterone therapy. Now on testosterone 120 mg IM every 12 days. Last injection 2 days ago. Will check peak testosterone level today and estradiol level as well and adjust testosterone as needed for goal trough testosterone of 300. He will consider testosterone pellets in the future. Now s/p top surgery with Dr. Pena. Not interested in bottom surgery as of yet, has seen GYN. Will need continued monitoring for cancer screening in the future such as mammography, colonoscopy, and PAP if no TERRELL. No plans for pregnancy. He does not want to entertain egg freezing or fertility options. Mental health follow-up recommended.

## 2019-08-12 NOTE — DATA REVIEWED
[FreeTextEntry1] : Labs 5/6/19:\par Estradiol 81\par LDL 78\par HDL 44\par Chol 136\par Trig 70\par CMP normal\par Testosterone 237\par \par Labs 1/2019:\par Testosterone 340.8\par Estradiol 40.93\par CMP normal\par CBC normal\par \par Labs 11/2018:\par Estradiol 54.83\par Testosterone 450\par CBC normal \par CMP normal\par \par Labs 7/24/18:\par Hb 13.1\par CMP normal\par Estradiol 39\par Testosterone 476.5\par \par Labs 3/2018:\par CBC normal\par LDL 82\par HDL 54\par Chol 145\par Trig 46\par CMP normal\par TSH 2.29\par Testosterone 35.2\par Estradiol 33\par Vit D 29.7\par DHEAS 204\par HIV nonreactive

## 2019-08-12 NOTE — HISTORY OF PRESENT ILLNESS
[FreeTextEntry1] : PAVITHRA HOWARD is a 18 year old, transmale here for transgender hormone management. \par \par Pronouns: He/ Him / His \par Sexual orientation: mostly straight\par Gender identity: Male\par \par Transition history (Social, Endo, Surgical): When the patient was little, he felt he was a boy. Hated dolls and girls stuff, wanted a penis when he was [age] years old. Interested in doing all masculine things. Identical twin sister (Twin A) is supportive. Always liked and done different things. Sister supportive of the patient's transition. When he was in the women's restroom since starting college realized that he didn't want to be a women any longer. Told older sister months before coming out that he was a delmi. Spoke with parents about coming out first. When he told his mom (early Dec 2017), she intially shut down the conversation and her dad didn't understand (Nov 2017), but they let her come out publically and have "come around' and are supportive. Since then, they are supportive, and call the patient by "Pavithra" but are still mis-gendering the patient with pronouns. Came out publically in early Dec 2017.\par \par Started on testosterone 6/18/18 100 mg every 2 weeks. Now on testosterone 120 mg every 12 days as he was still having menses. Last injection 2 days ago. Injects into buttocks. No issues with injections. Since starting testosterone, has noticed voice changes (deeper voice), bottom growth, some increased hair growth but not as much as he would like, clitoromegaly. Has increased libido. Has also had worsening acne, especially over the shoulders and upper arms. Has been using clindamycin cream. He states that he would like more hair growth and a deeper voice. He has had dysphoria about chest, and has now undergone top surgery 12/2018 periareolar with Dr. Pena and is satisfied with the results. Has seen gyn but now interested in hysterectomy. Awaiting surgery with Dr. Santoyo. At this time and not interested in egg freezing. LMP 6/15/2019. Menses: periods had been regular prior to initiation of testosterone. At this time, he does not have headaches, blurry vision, chest pain, palpitations, abdominal pain, nausea, vomiting, diarrhea, constipation, leg/calf pain, lower extremity edema. \par \par Education: Pt attends [employer], studying music/performance. High school: [employer]. 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 Escobar. - Psychiatrist: Dr. Edwin Bran ([facility] ) Pt has seen provider since June 2017, sessions are monthly. Happy to write letter. - Psychologist: Dr. Sri Quintero ( [facility] ) Pt has seen provider since May 2017, sessions are weekly. Provider is more on board with pt's transition than psychiatrist, according to pt's mother. Pt has more a relationship with Dr. Quintero. - PCP: Gen Anthony Fraire\par Hospital admission: 15 y/o allergic reaction to seseme seeds; pt had tongue swelling and lip swelling and gave himself epipen and got whole body hives. Follow with Dr. De Santiago for allergy. Last seen years ago. 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 Now follows with Dr. Serrano at [facility]. Food Allegy: avoiding tree nuts and all seeds. Also avoids peanuts out of an abundance of caution.Asthma: none Pollen allergies: spring mostly and some in the fall. Sometime take claritin. Helps. Drug Allergy: Amox/PCN - hives over whole body, last at age 3 years old. \par

## 2019-08-12 NOTE — REVIEW OF SYSTEMS
[Depression] : depression [All other systems negative] : All other systems negative [Recent Weight Gain (___ Lbs)] : no recent weight gain [Fever] : no fever [Recent Weight Loss (___ Lbs)] : no recent weight loss [Chills] : no chills [Dysphagia] : no dysphagia [Blurry Vision] : no blurred vision [Dysphonia] : no dysphonia [Neck Pain] : no neck pain [Chest Pain] : no chest pain [Palpitations] : no palpitations [Lower Ext Edema] : no lower extremity edema [Nausea] : no nausea [Cough] : no cough [Shortness Of Breath] : no shortness of breath [Constipation] : no constipation [Vomiting] : no vomiting was observed [Joint Pain] : no joint pain [Diarrhea] : no diarrhea [Abdominal Pain] : no abdominal pain [Myalgia] : no myalgia  [Headache] : no headaches [Tremors] : no tremors [Anxiety] : no anxiety

## 2019-08-12 NOTE — PHYSICAL EXAM
[No Acute Distress] : no acute distress [Alert] : alert [Well Developed] : well developed [Well Nourished] : well nourished [Normal Sclera/Conjunctiva] : normal sclera/conjunctiva [No Proptosis] : no proptosis [No Neck Mass] : no neck mass was observed [Supple] : the neck was supple [Thyroid Not Enlarged] : the thyroid was not enlarged [No Respiratory Distress] : no respiratory distress [Normal Rate and Effort] : normal respiratory rhythm and effort [No Accessory Muscle Use] : no accessory muscle use [Clear to Auscultation] : lungs were clear to auscultation bilaterally [Normal Rate] : heart rate was normal  [Normal S1, S2] : normal S1 and S2 [Regular Rhythm] : with a regular rhythm [No Edema] : there was no peripheral edema [Normal Bowel Sounds] : normal bowel sounds [Not Tender] : non-tender [Not Distended] : not distended [Soft] : abdomen soft [No Masses] : no abdominal mass palpated [Normal Gait] : normal gait [No Clubbing, Cyanosis] : no clubbing  or cyanosis of the fingernails [Normal Strength/Tone] : muscle strength and tone were normal [Acne] : acne [No Motor Deficits] : the motor exam was normal [No Tremors] : no tremors [Oriented x3] : oriented to person, place, and time [Normal Insight/Judgement] : insight and judgment were intact [Normal Mood] : the mood was normal [Normal Affect] : the affect was normal [Abdominal Striae] : no abdominal striae [Acanthosis Nigricans] : no acanthosis nigricans [de-identified] : +b/l mastectomy and periareolar scars healed

## 2019-08-13 LAB
ALBUMIN SERPL ELPH-MCNC: 4.8 G/DL
ALP BLD-CCNC: 56 U/L
ALT SERPL-CCNC: 15 U/L
ANION GAP SERPL CALC-SCNC: 15 MMOL/L
AST SERPL-CCNC: 20 U/L
BASOPHILS # BLD AUTO: 0.02 K/UL
BASOPHILS NFR BLD AUTO: 0.4 %
BILIRUB SERPL-MCNC: 0.6 MG/DL
BUN SERPL-MCNC: 11 MG/DL
CALCIUM SERPL-MCNC: 9.8 MG/DL
CHLORIDE SERPL-SCNC: 102 MMOL/L
CO2 SERPL-SCNC: 24 MMOL/L
CREAT SERPL-MCNC: 0.94 MG/DL
EOSINOPHIL # BLD AUTO: 0.05 K/UL
EOSINOPHIL NFR BLD AUTO: 1 %
ESTRADIOL SERPL-MCNC: 90 PG/ML
GLUCOSE SERPL-MCNC: 88 MG/DL
HCT VFR BLD CALC: 47.7 %
HGB BLD-MCNC: 15.1 G/DL
IMM GRANULOCYTES NFR BLD AUTO: 0.2 %
LYMPHOCYTES # BLD AUTO: 1.72 K/UL
LYMPHOCYTES NFR BLD AUTO: 34.1 %
MAN DIFF?: NORMAL
MCHC RBC-ENTMCNC: 28.8 PG
MCHC RBC-ENTMCNC: 31.7 GM/DL
MCV RBC AUTO: 91 FL
MONOCYTES # BLD AUTO: 0.57 K/UL
MONOCYTES NFR BLD AUTO: 11.3 %
NEUTROPHILS # BLD AUTO: 2.67 K/UL
NEUTROPHILS NFR BLD AUTO: 53 %
PLATELET # BLD AUTO: 216 K/UL
POTASSIUM SERPL-SCNC: 4.5 MMOL/L
PROT SERPL-MCNC: 7.5 G/DL
RBC # BLD: 5.24 M/UL
RBC # FLD: 13.4 %
SODIUM SERPL-SCNC: 141 MMOL/L
TESTOST SERPL-MCNC: 1467 NG/DL
WBC # FLD AUTO: 5.04 K/UL

## 2019-08-14 ENCOUNTER — OTHER (OUTPATIENT)
Age: 19
End: 2019-08-14

## 2019-08-26 ENCOUNTER — OTHER (OUTPATIENT)
Age: 19
End: 2019-08-26

## 2019-08-27 ENCOUNTER — APPOINTMENT (OUTPATIENT)
Dept: PLASTIC SURGERY | Facility: CLINIC | Age: 19
End: 2019-08-27
Payer: COMMERCIAL

## 2019-08-27 VITALS
HEIGHT: 65 IN | OXYGEN SATURATION: 100 % | BODY MASS INDEX: 21.66 KG/M2 | DIASTOLIC BLOOD PRESSURE: 69 MMHG | RESPIRATION RATE: 16 BRPM | WEIGHT: 130 LBS | HEART RATE: 69 BPM | SYSTOLIC BLOOD PRESSURE: 119 MMHG | TEMPERATURE: 98 F

## 2019-08-27 PROCEDURE — 99213 OFFICE O/P EST LOW 20 MIN: CPT

## 2019-08-27 NOTE — HISTORY OF PRESENT ILLNESS
[FreeTextEntry1] : 19 yo transgender male patient who is s/p periareolar top surgery presents for long-term f/u.  He has been working out and weight-lifting.  He is pleased with his result and has no concerns.

## 2019-08-27 NOTE — PHYSICAL EXAM
[NI] : Normal [de-identified] : Incisions well-healed.  Good position of nipples.  Left NAC slightly larger than right.  Nipples sensate.

## 2019-09-03 ENCOUNTER — RX RENEWAL (OUTPATIENT)
Age: 19
End: 2019-09-03

## 2019-09-03 ENCOUNTER — TRANSCRIPTION ENCOUNTER (OUTPATIENT)
Age: 19
End: 2019-09-03

## 2019-09-13 ENCOUNTER — APPOINTMENT (OUTPATIENT)
Dept: PEDIATRIC ALLERGY IMMUNOLOGY | Facility: CLINIC | Age: 19
End: 2019-09-13
Payer: COMMERCIAL

## 2019-09-13 DIAGNOSIS — J30.2 OTHER SEASONAL ALLERGIC RHINITIS: ICD-10-CM

## 2019-09-13 DIAGNOSIS — J30.1 ALLERGIC RHINITIS DUE TO POLLEN: ICD-10-CM

## 2019-09-13 PROCEDURE — 99215 OFFICE O/P EST HI 40 MIN: CPT

## 2019-09-13 RX ORDER — CERAMIDE 1,3,6-II/SALICYLIC/B3
CLEANSER (ML) TOPICAL
Refills: 0 | Status: DISCONTINUED | COMMUNITY
Start: 2018-06-28 | End: 2019-09-13

## 2019-09-17 ENCOUNTER — OTHER (OUTPATIENT)
Age: 19
End: 2019-09-17

## 2019-09-26 ENCOUNTER — APPOINTMENT (OUTPATIENT)
Dept: OBGYN | Facility: CLINIC | Age: 19
End: 2019-09-26
Payer: COMMERCIAL

## 2019-09-26 PROCEDURE — 99214 OFFICE O/P EST MOD 30 MIN: CPT

## 2019-10-07 NOTE — ASU PREOPERATIVE ASSESSMENT, ADULT (IPARK ONLY) - WEIGHT IN KG
[FreeTextEntry1] : 1. Iron deficiency anemia, gassiness, erratic bowels, with weekly positive gliadin deamidated IgA--rule out celiac disease, IBD, severe esophagitis, GI neoplasia. Guaiac negative at consultation here several days ago. \par 2. Status post LULectomy & chemotherapy for stage II adenocarcinoma of the lung.\par 3. COPD; ex-smoker.\par 4. Recent syncope likely from vasovagal. \par 5. History of migraines.\par 6. Status post .\par \par Plan:\par 1. J ER records reviewed.\par 2. Schedule colonoscopy and EGD-- Procedures, rationale, alternatives, material risks, anesthesia plan, Suprep instructions were reviewed (with Reglan starting on the morning of the bowel prep) and brochures given. Hold iron x several days prior. She is aware of the possibility of underlying cancer.\par 
58.1

## 2019-10-21 ENCOUNTER — RX RENEWAL (OUTPATIENT)
Age: 19
End: 2019-10-21

## 2019-10-21 ENCOUNTER — TRANSCRIPTION ENCOUNTER (OUTPATIENT)
Age: 19
End: 2019-10-21

## 2019-11-29 ENCOUNTER — TRANSCRIPTION ENCOUNTER (OUTPATIENT)
Age: 19
End: 2019-11-29

## 2019-12-10 ENCOUNTER — APPOINTMENT (OUTPATIENT)
Dept: ENDOCRINOLOGY | Facility: CLINIC | Age: 19
End: 2019-12-10

## 2019-12-23 ENCOUNTER — OUTPATIENT (OUTPATIENT)
Dept: OUTPATIENT SERVICES | Facility: HOSPITAL | Age: 19
LOS: 1 days | End: 2019-12-23
Payer: COMMERCIAL

## 2019-12-23 VITALS
HEIGHT: 66 IN | TEMPERATURE: 99 F | SYSTOLIC BLOOD PRESSURE: 130 MMHG | WEIGHT: 126.99 LBS | OXYGEN SATURATION: 99 % | RESPIRATION RATE: 16 BRPM | HEART RATE: 63 BPM | DIASTOLIC BLOOD PRESSURE: 80 MMHG

## 2019-12-23 DIAGNOSIS — Z29.9 ENCOUNTER FOR PROPHYLACTIC MEASURES, UNSPECIFIED: ICD-10-CM

## 2019-12-23 DIAGNOSIS — K08.409 PARTIAL LOSS OF TEETH, UNSPECIFIED CAUSE, UNSPECIFIED CLASS: Chronic | ICD-10-CM

## 2019-12-23 DIAGNOSIS — F64.9 GENDER IDENTITY DISORDER, UNSPECIFIED: ICD-10-CM

## 2019-12-23 DIAGNOSIS — Z01.818 ENCOUNTER FOR OTHER PREPROCEDURAL EXAMINATION: ICD-10-CM

## 2019-12-23 DIAGNOSIS — Z90.13 ACQUIRED ABSENCE OF BILATERAL BREASTS AND NIPPLES: Chronic | ICD-10-CM

## 2019-12-23 DIAGNOSIS — F64.1 DUAL ROLE TRANSVESTISM: ICD-10-CM

## 2019-12-23 LAB
ANION GAP SERPL CALC-SCNC: 12 MMOL/L — SIGNIFICANT CHANGE UP (ref 5–17)
BLD GP AB SCN SERPL QL: NEGATIVE — SIGNIFICANT CHANGE UP
BUN SERPL-MCNC: 7 MG/DL — SIGNIFICANT CHANGE UP (ref 7–23)
CALCIUM SERPL-MCNC: 9.8 MG/DL — SIGNIFICANT CHANGE UP (ref 8.4–10.5)
CHLORIDE SERPL-SCNC: 99 MMOL/L — SIGNIFICANT CHANGE UP (ref 96–108)
CO2 SERPL-SCNC: 28 MMOL/L — SIGNIFICANT CHANGE UP (ref 22–31)
CREAT SERPL-MCNC: 0.82 MG/DL — SIGNIFICANT CHANGE UP (ref 0.5–1.3)
GLUCOSE SERPL-MCNC: 104 MG/DL — HIGH (ref 70–99)
HBA1C BLD-MCNC: 5.1 % — SIGNIFICANT CHANGE UP (ref 4–5.6)
HCT VFR BLD CALC: 49.1 % — SIGNIFICANT CHANGE UP (ref 39–50)
HGB BLD-MCNC: 15.8 G/DL — SIGNIFICANT CHANGE UP (ref 13–17)
MCHC RBC-ENTMCNC: 29.3 PG — SIGNIFICANT CHANGE UP (ref 27–34)
MCHC RBC-ENTMCNC: 32.2 GM/DL — SIGNIFICANT CHANGE UP (ref 32–36)
MCV RBC AUTO: 91.1 FL — SIGNIFICANT CHANGE UP (ref 80–100)
PLATELET # BLD AUTO: 200 K/UL — SIGNIFICANT CHANGE UP (ref 150–400)
POTASSIUM SERPL-MCNC: 4.3 MMOL/L — SIGNIFICANT CHANGE UP (ref 3.5–5.3)
POTASSIUM SERPL-SCNC: 4.3 MMOL/L — SIGNIFICANT CHANGE UP (ref 3.5–5.3)
RBC # BLD: 5.39 M/UL — SIGNIFICANT CHANGE UP (ref 4.2–5.8)
RBC # FLD: 12.6 % — SIGNIFICANT CHANGE UP (ref 10.3–14.5)
RH IG SCN BLD-IMP: POSITIVE — SIGNIFICANT CHANGE UP
SODIUM SERPL-SCNC: 139 MMOL/L — SIGNIFICANT CHANGE UP (ref 135–145)
WBC # BLD: 5.4 K/UL — SIGNIFICANT CHANGE UP (ref 3.8–10.5)
WBC # FLD AUTO: 5.4 K/UL — SIGNIFICANT CHANGE UP (ref 3.8–10.5)

## 2019-12-23 PROCEDURE — 86901 BLOOD TYPING SEROLOGIC RH(D): CPT

## 2019-12-23 PROCEDURE — 86850 RBC ANTIBODY SCREEN: CPT

## 2019-12-23 PROCEDURE — 85027 COMPLETE CBC AUTOMATED: CPT

## 2019-12-23 PROCEDURE — 83036 HEMOGLOBIN GLYCOSYLATED A1C: CPT

## 2019-12-23 PROCEDURE — G0463: CPT

## 2019-12-23 PROCEDURE — 80048 BASIC METABOLIC PNL TOTAL CA: CPT

## 2019-12-23 PROCEDURE — 86900 BLOOD TYPING SEROLOGIC ABO: CPT

## 2019-12-23 RX ORDER — CIPROFLOXACIN LACTATE 400MG/40ML
400 VIAL (ML) INTRAVENOUS ONCE
Refills: 0 | Status: DISCONTINUED | OUTPATIENT
Start: 2019-12-30 | End: 2020-02-03

## 2019-12-23 NOTE — H&P PST ADULT - IS PATIENT PREGNANT?
no planned for hysterectomy ( female to male ) planned for hysterectomy ( female to male ), LMP 11/28/19

## 2019-12-23 NOTE — H&P PST ADULT - MUSCULOSKELETAL
negative detailed exam normal strength/no joint erythema/no joint warmth/no calf tenderness/ROM intact/no joint swelling No joint pain, swelling or deformity; no limitation of movement

## 2019-12-23 NOTE — H&P PST ADULT - NSICDXPASTSURGICALHX_GEN_ALL_CORE_FT
PAST SURGICAL HISTORY:  No significant past surgical history PAST SURGICAL HISTORY:  H/O bilateral mastectomy 2018    H/O wisdom tooth extraction

## 2019-12-23 NOTE — H&P PST ADULT - NSICDXPASTMEDICALHX_GEN_ALL_CORE_FT
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

## 2019-12-23 NOTE — H&P PST ADULT - ASSESSMENT
NIMOI VTE 2.0 SCORE [CLOT updated 2019]    AGE RELATED RISK FACTORS                                                       MOBILITY RELATED FACTORS  [ ] Age 41-60 years                                            (1 Point)                    [ ] Bed rest                                                        (1 Point)  [ ] Age: 61-74 years                                           (2 Points)                  [ ] Plaster cast                                                   (2 Points)  [ ] Age= 75 years                                              (3 Points)                    [ ] Bed bound for more than 72 hours                 (2 Points)    DISEASE RELATED RISK FACTORS                                               GENDER SPECIFIC FACTORS  [ ] Edema in the lower extremities                       (1 Point)              [ ] Pregnancy                                                     (1 Point)  [ ] Varicose veins                                               (1 Point)                     [ ] Post-partum < 6 weeks                                   (1 Point)             [ ] BMI > 25 Kg/m2                                            (1 Point)                     [ ] Hormonal therapy  or oral contraception          (1 Point)                 [ ] Sepsis (in the previous month)                        (1 Point)               [ ] History of pregnancy complications                 (1 point)  [ ] Pneumonia or serious lung disease                                               [ ] Unexplained or recurrent                     (1 Point)           (in the previous month)                               (1 Point)  [ ] Abnormal pulmonary function test                     (1 Point)                 SURGERY RELATED RISK FACTORS  [ ] Acute myocardial infarction                              (1 Point)               [ ]  Section                                             (1 Point)  [ ] Congestive heart failure (in the previous month)  (1 Point)      [ ] Minor surgery                                                  (1 Point)   [ ] Inflammatory bowel disease                             (1 Point)               [ ] Arthroscopic surgery                                        (2 Points)  [ ] Central venous access                                      (2 Points)                [ ] General surgery lasting more than 45 minutes (2 points)  [ ] Malignancy- Present or previous                   (2 Points)                [ ] Elective arthroplasty                                         (5 points)    [ ] Stroke (in the previous month)                          (5 Points)                                                                                                                                                           HEMATOLOGY RELATED FACTORS                                                 TRAUMA RELATED RISK FACTORS  [ ] Prior episodes of VTE                                     (3 Points)                [ ] Fracture of the hip, pelvis, or leg                       (5 Points)  [ ] Positive family history for VTE                         (3 Points)             [ ] Acute spinal cord injury (in the previous month)  (5 Points)  [ ] Prothrombin 04053 A                                     (3 Points)               [ ] Paralysis  (less than 1 month)                             (5 Points)  [ ] Factor V Leiden                                             (3 Points)                  [ ] Multiple Trauma within 1 month                        (5 Points)  [ ] Lupus anticoagulants                                     (3 Points)                                                           [ ] Anticardiolipin antibodies                               (3 Points)                                                       [ ] High homocysteine in the blood                      (3 Points)                                             [ ] Other congenital or acquired thrombophilia      (3 Points)                                                [ ] Heparin induced thrombocytopenia                  (3 Points)                                     Total Score [          ] NIMOI VTE 2.0 SCORE [CLOT updated 2019]    AGE RELATED RISK FACTORS                                                       MOBILITY RELATED FACTORS  [ ] Age 41-60 years                                            (1 Point)                    [ ] Bed rest                                                        (1 Point)  [ ] Age: 61-74 years                                           (2 Points)                  [ ] Plaster cast                                                   (2 Points)  [ ] Age= 75 years                                              (3 Points)                    [ ] Bed bound for more than 72 hours                 (2 Points)    DISEASE RELATED RISK FACTORS                                               GENDER SPECIFIC FACTORS  [ ] Edema in the lower extremities                       (1 Point)              [ ] Pregnancy                                                     (1 Point)  [ ] Varicose veins                                               (1 Point)                     [ ] Post-partum < 6 weeks                                   (1 Point)             [ ] BMI > 25 Kg/m2                                            (1 Point)                     [ ] Hormonal therapy  or oral contraception          (1 Point)                 [ ] Sepsis (in the previous month)                        (1 Point)               [ ] History of pregnancy complications                 (1 point)  [ ] Pneumonia or serious lung disease                                               [ ] Unexplained or recurrent                     (1 Point)           (in the previous month)                               (1 Point)  [ ] Abnormal pulmonary function test                     (1 Point)                 SURGERY RELATED RISK FACTORS  [ ] Acute myocardial infarction                              (1 Point)               [ ]  Section                                             (1 Point)  [ ] Congestive heart failure (in the previous month)  (1 Point)      [ ] Minor surgery                                                  (1 Point)   [ ] Inflammatory bowel disease                             (1 Point)               [ ] Arthroscopic surgery                                        (2 Points)  [ ] Central venous access                                      (2 Points)                [x ] General surgery lasting more than 45 minutes (2 points)  [ ] Malignancy- Present or previous                   (2 Points)                [ ] Elective arthroplasty                                         (5 points)    [ ] Stroke (in the previous month)                          (5 Points)                                                                                                                                                           HEMATOLOGY RELATED FACTORS                                                 TRAUMA RELATED RISK FACTORS  [ ] Prior episodes of VTE                                     (3 Points)                [ ] Fracture of the hip, pelvis, or leg                       (5 Points)  [ ] Positive family history for VTE                         (3 Points)             [ ] Acute spinal cord injury (in the previous month)  (5 Points)  [ ] Prothrombin 94130 A                                     (3 Points)               [ ] Paralysis  (less than 1 month)                             (5 Points)  [ ] Factor V Leiden                                             (3 Points)                  [ ] Multiple Trauma within 1 month                        (5 Points)  [ ] Lupus anticoagulants                                     (3 Points)                                                           [ ] Anticardiolipin antibodies                               (3 Points)                                                       [ ] High homocysteine in the blood                      (3 Points)                                             [ ] Other congenital or acquired thrombophilia      (3 Points)                                                [ ] Heparin induced thrombocytopenia                  (3 Points)                                     Total Score [     2     ]

## 2019-12-23 NOTE — H&P PST ADULT - ATTENDING COMMENTS
patient undergoing transition s/p fawad mastectomy for TLH BSO pt is well aware of the risks and alternative options.

## 2019-12-23 NOTE — H&P PST ADULT - HISTORY OF PRESENT ILLNESS
19 year old trasgender ( female to male) with no significant medical history planned for 19 year old transgender ( female to male) planned for total laparoscopic hysterectomy, bilateral salpingo oophorectomy, possible exploratory laparotomy on 12/30/19.

## 2019-12-23 NOTE — H&P PST ADULT - NSICDXPROBLEM_GEN_ALL_CORE_FT
PROBLEM DIAGNOSES  Problem: Gender identity disorder  Assessment and Plan: planned for total laparoscopic hysterectomy, bilateral salpingo oophorectomy, possible exploratory laparotomy on 12/30/19.    UcG the day of procedure   PST labs send  preprocedure surgical scrub instructions discussed       Problem: Need for prophylactic measure  Assessment and Plan: The Caprini score indicates that this patient is low risk for a VTE event (score 0-2).  VTE prophylaxis should focus on early ambulation.  Intermittent compression devices (IPC) may be of benefit to some patients

## 2019-12-23 NOTE — H&P PST ADULT - NEUROLOGICAL DETAILS
sensation intact/alert and oriented x 3/normal strength normal strength/no spontaneous movement/alert and oriented x 3/sensation intact

## 2019-12-26 PROBLEM — J30.1 ALLERGIC RHINITIS DUE TO POLLEN: Status: ACTIVE | Noted: 2018-03-23

## 2019-12-26 NOTE — PHYSICAL EXAM
[Well Nourished] : well nourished [Alert] : alert [Healthy Appearance] : healthy appearance [No Acute Distress] : no acute distress [Well Developed] : well developed [Normal Pupil & Iris Size/Symmetry] : normal pupil and iris size and symmetry [No Discharge] : no discharge [No Photophobia] : no photophobia [Normal TMs] : both tympanic membranes were normal [Sclera Not Icteric] : sclera not icteric [Normal Nasal Mucosa] : the nasal mucosa was normal [Normal Outer Ear/Nose] : the ears and nose were normal in appearance [Normal Lips/Tongue] : the lips and tongue were normal [Normal Tonsils] : normal tonsils [No Thrush] : no thrush [Normal Dentition] : normal dentition [No Oral Lesions or Ulcers] : no oral lesions or ulcers [Supple] : the neck was supple [Normal Rate and Effort] : normal respiratory rhythm and effort [Normal Palpation] : palpation of the chest revealed no abnormalities [No Crackles] : no crackles [No Retractions] : no retractions [Bilateral Audible Breath Sounds] : bilateral audible breath sounds [Normal Rate] : heart rate was normal  [Normal S1, S2] : normal S1 and S2 [No murmur] : no murmur [Soft] : abdomen soft [Regular Rhythm] : with a regular rhythm [Not Tender] : non-tender [Not Distended] : not distended [Normal Cervical Lymph Nodes] : cervical [No HSM] : no hepato-splenomegaly [Normal Axillary Lumph Nodes] : axillary [Skin Intact] : skin intact  [No Rash] : no rash [No Skin Lesions] : no skin lesions [No Joint Swelling or Erythema] : no joint swelling or erythema [No clubbing] : no clubbing [No Edema] : no edema [No Cyanosis] : no cyanosis [Normal Mood] : mood was normal [Normal Affect] : affect was normal [Alert, Awake, Oriented as Age-Appropriate] : alert, awake, oriented as age appropriate [de-identified] : male facial hair distribution; let hair [de-identified] : post nasl adrip [de-identified] : acne on chest/arms - controlled ; perioareolar incisions c/d/i bilaterally

## 2019-12-26 NOTE — DISCUSSION/SUMMARY
[15 min] : 15 min [HIV Education] : HIV Education [Universal Precautions] : universal precautions [Treatment Education] : treatment education [Anticipatory Guidance] : anticipatory guidance [Treatment Adherence] : treatment adherence [Prognosis] : prognosis [Pre-conception Counseling] : pre-conception counseling [Risk Reduction] : risk reduction [Condoms] : condoms [The patient was able to ask questions and explain these concepts in his/her own words] : the patient was able to ask questions and explain these concepts in his/her own words [PrEP/PEP] : PrEP/PEP [The Topics Listed Above] : the topics listed above

## 2019-12-26 NOTE — HISTORY OF PRESENT ILLNESS
[FreeTextEntry1] : PEPE HOWARD is a 18 year old, transmale seen on 2019 for followup trans care. \par \par Pt is living as a male.  He first identified as  a male Dec 2017.  Pt saw me initially on 18 and has been in care since then. \par \par Pt started testosterone on 18. Donig well.  No side effects.  Injecting every 12 days, under the care of Dr. Valdes.  \par \par Pt had top surgery 18 with Dr. Pena, pleased with outcome. Periareolar approach.  \par \par Met with Dr. Santoyo in 2019 for consult for abdominal TERRELL with BSO.  Plans to f/u and scheudle a date in the future. Pt presents with mental health clearance letters from Jeffrey Quintero, PhD, Psychologist and Edwin Spencer MD, Psychiatrist for review. 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 \par \par Nothing else new. \par \par No new medical concerns.  \par \par FHx of breast cancer.  Maternal GM -  of breast cancer at age 54 y/o; mom had cysts; sister 31 y/o with sterotacitic bipspy with marker.  Maternal cousin - breast cancer.  Pt's famly has not had breast cancer screening with genetics.  \par

## 2019-12-29 ENCOUNTER — TRANSCRIPTION ENCOUNTER (OUTPATIENT)
Age: 19
End: 2019-12-29

## 2019-12-30 ENCOUNTER — OUTPATIENT (OUTPATIENT)
Dept: OUTPATIENT SERVICES | Facility: HOSPITAL | Age: 19
LOS: 1 days | End: 2019-12-30
Payer: COMMERCIAL

## 2019-12-30 ENCOUNTER — RESULT REVIEW (OUTPATIENT)
Age: 19
End: 2019-12-30

## 2019-12-30 ENCOUNTER — APPOINTMENT (OUTPATIENT)
Dept: OBGYN | Facility: HOSPITAL | Age: 19
End: 2019-12-30

## 2019-12-30 VITALS
HEIGHT: 66 IN | OXYGEN SATURATION: 99 % | HEART RATE: 95 BPM | TEMPERATURE: 99 F | WEIGHT: 126.99 LBS | RESPIRATION RATE: 18 BRPM | DIASTOLIC BLOOD PRESSURE: 78 MMHG | SYSTOLIC BLOOD PRESSURE: 125 MMHG

## 2019-12-30 VITALS
RESPIRATION RATE: 15 BRPM | HEART RATE: 98 BPM | TEMPERATURE: 97 F | SYSTOLIC BLOOD PRESSURE: 130 MMHG | OXYGEN SATURATION: 100 %

## 2019-12-30 DIAGNOSIS — Z90.13 ACQUIRED ABSENCE OF BILATERAL BREASTS AND NIPPLES: Chronic | ICD-10-CM

## 2019-12-30 DIAGNOSIS — Z01.818 ENCOUNTER FOR OTHER PREPROCEDURAL EXAMINATION: ICD-10-CM

## 2019-12-30 DIAGNOSIS — F64.1 DUAL ROLE TRANSVESTISM: ICD-10-CM

## 2019-12-30 DIAGNOSIS — K08.409 PARTIAL LOSS OF TEETH, UNSPECIFIED CAUSE, UNSPECIFIED CLASS: Chronic | ICD-10-CM

## 2019-12-30 LAB
GLUCOSE BLDC GLUCOMTR-MCNC: 97 MG/DL — SIGNIFICANT CHANGE UP (ref 70–99)
HCG UR QL: NEGATIVE — SIGNIFICANT CHANGE UP
RH IG SCN BLD-IMP: POSITIVE — SIGNIFICANT CHANGE UP

## 2019-12-30 PROCEDURE — 81025 URINE PREGNANCY TEST: CPT

## 2019-12-30 PROCEDURE — 86901 BLOOD TYPING SEROLOGIC RH(D): CPT

## 2019-12-30 PROCEDURE — 58571 TLH W/T/O 250 G OR LESS: CPT

## 2019-12-30 PROCEDURE — C1889: CPT

## 2019-12-30 PROCEDURE — 88307 TISSUE EXAM BY PATHOLOGIST: CPT

## 2019-12-30 PROCEDURE — 86900 BLOOD TYPING SEROLOGIC ABO: CPT

## 2019-12-30 PROCEDURE — 88307 TISSUE EXAM BY PATHOLOGIST: CPT | Mod: 26

## 2019-12-30 PROCEDURE — 82962 GLUCOSE BLOOD TEST: CPT

## 2019-12-30 PROCEDURE — 58571 TLH W/T/O 250 G OR LESS: CPT | Mod: 80

## 2019-12-30 RX ORDER — OXYCODONE HYDROCHLORIDE 5 MG/1
1 TABLET ORAL
Qty: 20 | Refills: 0
Start: 2019-12-30

## 2019-12-30 RX ORDER — ARIPIPRAZOLE 15 MG/1
0 TABLET ORAL
Qty: 0 | Refills: 0 | DISCHARGE

## 2019-12-30 RX ORDER — SODIUM CHLORIDE 9 MG/ML
1000 INJECTION, SOLUTION INTRAVENOUS
Refills: 0 | Status: DISCONTINUED | OUTPATIENT
Start: 2019-12-30 | End: 2020-02-03

## 2019-12-30 RX ORDER — CHLORHEXIDINE GLUCONATE 213 G/1000ML
1 SOLUTION TOPICAL ONCE
Refills: 0 | Status: DISCONTINUED | OUTPATIENT
Start: 2019-12-30 | End: 2019-12-30

## 2019-12-30 RX ORDER — CELECOXIB 200 MG/1
200 CAPSULE ORAL ONCE
Refills: 0 | Status: COMPLETED | OUTPATIENT
Start: 2019-12-30 | End: 2019-12-30

## 2019-12-30 RX ORDER — ACETAMINOPHEN 500 MG
975 TABLET ORAL ONCE
Refills: 0 | Status: DISCONTINUED | OUTPATIENT
Start: 2019-12-30 | End: 2019-12-30

## 2019-12-30 RX ORDER — FAMOTIDINE 10 MG/ML
20 INJECTION INTRAVENOUS ONCE
Refills: 0 | Status: COMPLETED | OUTPATIENT
Start: 2019-12-30 | End: 2019-12-30

## 2019-12-30 RX ORDER — SODIUM CHLORIDE 9 MG/ML
3 INJECTION INTRAMUSCULAR; INTRAVENOUS; SUBCUTANEOUS EVERY 8 HOURS
Refills: 0 | Status: DISCONTINUED | OUTPATIENT
Start: 2019-12-30 | End: 2019-12-30

## 2019-12-30 RX ORDER — ACETAMINOPHEN 500 MG
2 TABLET ORAL
Qty: 0 | Refills: 0 | DISCHARGE

## 2019-12-30 RX ORDER — ONDANSETRON 8 MG/1
4 TABLET, FILM COATED ORAL ONCE
Refills: 0 | Status: DISCONTINUED | OUTPATIENT
Start: 2019-12-30 | End: 2019-12-30

## 2019-12-30 RX ORDER — HYDROMORPHONE HYDROCHLORIDE 2 MG/ML
0.5 INJECTION INTRAMUSCULAR; INTRAVENOUS; SUBCUTANEOUS
Refills: 0 | Status: DISCONTINUED | OUTPATIENT
Start: 2019-12-30 | End: 2019-12-30

## 2019-12-30 RX ADMIN — FAMOTIDINE 20 MILLIGRAM(S): 10 INJECTION INTRAVENOUS at 09:21

## 2019-12-30 RX ADMIN — SODIUM CHLORIDE 3 MILLILITER(S): 9 INJECTION INTRAMUSCULAR; INTRAVENOUS; SUBCUTANEOUS at 09:26

## 2019-12-30 RX ADMIN — CELECOXIB 200 MILLIGRAM(S): 200 CAPSULE ORAL at 09:21

## 2019-12-30 NOTE — ASU DISCHARGE PLAN (ADULT/PEDIATRIC) - ASU DC SPECIAL INSTRUCTIONSFT
Regular diet.  Resume normal activity as tolerated.  No heavy lifting, driving, or strenuous activity for 6 weeks.  Call your doctor with any signs and symptoms of infection such as fever, chills, nausea or vomiting.  Call your doctor with redness or swelling at the incision site, fluid leakage or wound separation.  Call your doctor if you're unable to tolerate food or have difficulty urinating.  Call your doctor if you have pain that is not relieved by your prescribed medications.  Notify your doctor with any other concerns.  Follow up with your doctor in 2 weeks.

## 2019-12-30 NOTE — PROGRESS NOTE ADULT - SUBJECTIVE AND OBJECTIVE BOX
PA POST-OP CHECK    Allergies:  dust, tree nuts, seeds, coconut, seasonal allergies (Unknown)                  penicillins (Hives)    S: Pt awake and alert resting comfortably in chair after just returning from voiding adequate amt.    Pain controlled. Pt denies N/V, SOB, CP, palpitations.    O:   T(C): 36.4 (12-30-19 @ 13:49), Max: 36.4 (12-30-19 @ 13:49)  HR: 117 (12-30-19 @ 16:00) (101 - 132)  BP: 135/62 (12-30-19 @ 16:00) (116/58 - 147/66)  RR: 16 (12-30-19 @ 16:00) (16 - 18)  SpO2: 100% (12-30-19 @ 16:00) (96% - 100%)    I&O's Summary    30 Dec 2019 07:01  -  30 Dec 2019 16:43  --------------------------------------------------------  IN: 975 mL / OUT: 200 mL / NET: 775 mL    Heart: S1S2, RRR  Lungs: CTA B/L  Abd: soft, appropriately tender, occassional BS x 4 quadrants  Inc: Clean/dry/intact-port sites  Pelvic:  (-) bleeding  Ext: PAS in place, Neg Homans B/L    A/P: 19  Female-> Male   S/P TLH-BSO in stable condition   with PMHx of   PAST MEDICAL & SURGICAL HISTORY:  Depression  Gender identity disorder in adolescence and adulthood  Schizophrenic disorder  H/O bilateral mastectomy: 2018  H/O wisdom tooth extraction    -Continue post-op care  -Analgesia in PACU & prn  -OOB with assistance x 2, then [facility]oc  -Meds:   ciprofloxacin   IVPB 400 milliGRAM(s) IV Intermittent once  clindamycin IVPB 900 milliGRAM(s) IV Intermittent once  HYDROmorphone  Injectable 0.5 milliGRAM(s) IV Push every 10 minutes PRN  lactated ringers. 1000 milliLiter(s) IV Continuous <Continuous>  ondansetron Injectable 4 milliGRAM(s) IV Push once PRN        -IVFluids- LR @  -Diet-  Advance as Tolerated to Reg  - Mead to gravity    /   Due to void  -PAS   -CBC & BMP in AM tomorrow

## 2019-12-30 NOTE — ASU DISCHARGE PLAN (ADULT/PEDIATRIC) - CARE PROVIDER_API CALL
Catracho Herrera)  Obstetrics and Gynecology  [address] [place] [address], [address]  [place] [address]  Phone: [phone]  Fax: [phone]  Follow Up Time:

## 2019-12-30 NOTE — BRIEF OPERATIVE NOTE - NSICDXBRIEFPROCEDURE_GEN_ALL_CORE_FT
PROCEDURES:  Bilateral salpingoophorectomy 30-Dec-2019 13:33:00  Jody Glez  Laparoscopic hysterectomy, total, uterus 250 g or less 30-Dec-2019 13:32:50  Jody Glez

## 2019-12-30 NOTE — ASU DISCHARGE PLAN (ADULT/PEDIATRIC) - ACTIVITY LEVEL
Nothing per vagina/No tub baths/No excercise/No weight bearing/Nothing per rectum/No tampons/Elevate extremity/No douching/No intercourse

## 2019-12-30 NOTE — BRIEF OPERATIVE NOTE - NSICDXBRIEFPREOP_GEN_ALL_CORE_FT
PRE-OP DIAGNOSIS:  Gender dysphoria in adult 30-Dec-2019 13:33:14  Jody Glez Treatment Number (Optional): 1

## 2019-12-30 NOTE — ASU DISCHARGE PLAN (ADULT/PEDIATRIC) - CALL YOUR DOCTOR IF YOU HAVE ANY OF THE FOLLOWING:
Swelling that gets worse/Bleeding that does not stop/Nausea and vomiting that does not stop/Unable to urinate/Pain not relieved by Medications/Fever greater than (need to indicate Fahrenheit or Celsius)/Numbness, tingling, color or temperature change to extremity

## 2020-01-02 ENCOUNTER — OTHER (OUTPATIENT)
Age: 20
End: 2020-01-02

## 2020-01-02 ENCOUNTER — TRANSCRIPTION ENCOUNTER (OUTPATIENT)
Age: 20
End: 2020-01-02

## 2020-01-02 ENCOUNTER — RX RENEWAL (OUTPATIENT)
Age: 20
End: 2020-01-02

## 2020-01-03 LAB — SURGICAL PATHOLOGY STUDY: SIGNIFICANT CHANGE UP

## 2020-01-09 ENCOUNTER — APPOINTMENT (OUTPATIENT)
Dept: OBGYN | Facility: CLINIC | Age: 20
End: 2020-01-09
Payer: COMMERCIAL

## 2020-01-09 PROCEDURE — 99024 POSTOP FOLLOW-UP VISIT: CPT

## 2020-01-14 ENCOUNTER — TRANSCRIPTION ENCOUNTER (OUTPATIENT)
Age: 20
End: 2020-01-14

## 2020-01-16 ENCOUNTER — APPOINTMENT (OUTPATIENT)
Dept: OBGYN | Facility: CLINIC | Age: 20
End: 2020-01-16
Payer: COMMERCIAL

## 2020-01-16 ENCOUNTER — TRANSCRIPTION ENCOUNTER (OUTPATIENT)
Age: 20
End: 2020-01-16

## 2020-01-16 PROCEDURE — 99024 POSTOP FOLLOW-UP VISIT: CPT

## 2020-02-03 ENCOUNTER — TRANSCRIPTION ENCOUNTER (OUTPATIENT)
Age: 20
End: 2020-02-03

## 2020-02-13 ENCOUNTER — APPOINTMENT (OUTPATIENT)
Dept: OBGYN | Facility: CLINIC | Age: 20
End: 2020-02-13
Payer: COMMERCIAL

## 2020-02-13 PROCEDURE — 99024 POSTOP FOLLOW-UP VISIT: CPT

## 2020-02-25 ENCOUNTER — TRANSCRIPTION ENCOUNTER (OUTPATIENT)
Age: 20
End: 2020-02-25

## 2020-03-02 ENCOUNTER — APPOINTMENT (OUTPATIENT)
Dept: ENDOCRINOLOGY | Facility: CLINIC | Age: 20
End: 2020-03-02
Payer: COMMERCIAL

## 2020-03-02 VITALS
TEMPERATURE: 98.4 F | BODY MASS INDEX: 19.99 KG/M2 | DIASTOLIC BLOOD PRESSURE: 76 MMHG | WEIGHT: 120 LBS | HEART RATE: 82 BPM | SYSTOLIC BLOOD PRESSURE: 127 MMHG | HEIGHT: 65 IN | OXYGEN SATURATION: 98 %

## 2020-03-02 PROCEDURE — 99214 OFFICE O/P EST MOD 30 MIN: CPT

## 2020-03-02 NOTE — DATA REVIEWED
[FreeTextEntry1] : Labs 11/2019:\par Testosterone 1467\par Estradiol 90\par CBC normal\par CMP normal\par \par Labs 5/6/19:\par Estradiol 81\par LDL 78\par HDL 44\par Chol 136\par Trig 70\par CMP normal\par Testosterone 237\par \par Labs 1/2019:\par Testosterone 340.8\par Estradiol 40.93\par CMP normal\par CBC normal\par \par Labs 11/2018:\par Estradiol 54.83\par Testosterone 450\par CBC normal \par CMP normal\par \par Labs 7/24/18:\par Hb 13.1\par CMP normal\par Estradiol 39\par Testosterone 476.5\par \par Labs 3/2018:\par CBC normal\par LDL 82\par HDL 54\par Chol 145\par Trig 46\par CMP normal\par TSH 2.29\par Testosterone 35.2\par Estradiol 33\par Vit D 29.7\par DHEAS 204\par HIV nonreactive

## 2020-03-02 NOTE — PHYSICAL EXAM
[Alert] : alert [Well Developed] : well developed [Well Nourished] : well nourished [No Acute Distress] : no acute distress [Normal Sclera/Conjunctiva] : normal sclera/conjunctiva [No Proptosis] : no proptosis [Thyroid Not Enlarged] : the thyroid was not enlarged [No Neck Mass] : no neck mass was observed [Supple] : the neck was supple [No Respiratory Distress] : no respiratory distress [Normal Rate and Effort] : normal respiratory rhythm and effort [Normal Rate] : heart rate was normal  [No Accessory Muscle Use] : no accessory muscle use [Clear to Auscultation] : lungs were clear to auscultation bilaterally [Normal S1, S2] : normal S1 and S2 [Regular Rhythm] : with a regular rhythm [Normal Bowel Sounds] : normal bowel sounds [No Edema] : there was no peripheral edema [Not Tender] : non-tender [Soft] : abdomen soft [Not Distended] : not distended [No Masses] : no abdominal mass palpated [Normal Gait] : normal gait [No Clubbing, Cyanosis] : no clubbing  or cyanosis of the fingernails [Acne] : acne [Normal Strength/Tone] : muscle strength and tone were normal [No Motor Deficits] : the motor exam was normal [Oriented x3] : oriented to person, place, and time [No Tremors] : no tremors [Normal Insight/Judgement] : insight and judgment were intact [Normal Affect] : the affect was normal [Normal Mood] : the mood was normal [Abdominal Striae] : no abdominal striae [Acanthosis Nigricans] : no acanthosis nigricans [de-identified] : +b/l mastectomy and periareolar scars healed

## 2020-03-02 NOTE — HISTORY OF PRESENT ILLNESS
[FreeTextEntry1] : PEPE HOWARD is a 19 year old, transmale here for transgender hormone management. \par \par Pronouns: He/ Him / His \par Sexual orientation: mostly straight\par Gender identity: Male\par \par Transition history (Social, Endo, Surgical): When the patient was little, he felt he was a boy. Hated dolls and girls stuff, wanted a penis when he was [age] years old. Interested in doing all masculine things. Identical twin sister (Twin A) is supportive. Always liked and done different things. Sister supportive of the patient's transition. When he was in the women's restroom since starting college realized that he didn't want to be a women any longer. Told older sister months before coming out that he was a delmi. Spoke with parents about coming out first. When he told his mom (early Dec 2017), she intially shut down the conversation and her dad didn't understand (Nov 2017), but they let her come out publically and have "come around' and are supportive. Since then, they are supportive, and call the patient by "Pepe". Came out publically in early Dec 2017.\par \par Started on testosterone 6/18/18 100 mg every 2 weeks. Now on testosterone 80 mg every 12 days (decreased from 0.6ml 8/2019 for testosterone >1400 2 days post-injection). Last injection 1 day ago. Injects into buttocks. No issues with injections. Since starting testosterone, has noticed voice changes (deeper voice), bottom growth, some increased hair growth but not as much as he would like, clitoromegaly. Has increased libido. Has also had worsening acne, especially over the shoulders and upper arms. Has been using clindamycin cream. He states that he would like more hair growth and a deeper voice. He has had dysphoria about chest, and has now undergone top surgery 12/2018 periareolar with Dr. Pena and is satisfied with the results. Has seen gyn s/p TERRELL with Dr. Santoyo 12/2019. At this time not interested in egg freezing. LMP 11/28/2019. Menses: periods had been regular prior to initiation of testosterone. At this time, he does not have headaches, blurry vision, chest pain, palpitations, abdominal pain, nausea, vomiting, diarrhea, constipation, leg/calf pain, lower extremity edema. \par \par Education: Pt attends [employer], studying music/performance. High school: [employer]. 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

## 2020-03-02 NOTE — ASSESSMENT
[FreeTextEntry1] : 18 y/o trans M with gender dysphoria here for hormone male transition- Discussed the treatment regimen of testosterone and risks and benefits. He is aware of risk of polycythemia, CVA, CAD, liver effects, and worsening underlying psychiatric disease possibly with testosterone use. Also discussed benefits such as facial and body hair growth, body fat redistribution, lack of periods, clitorimegaly, deeper voice etc. He is aware the effects can take anywhere from 3-6 months for onset and 2 years for maximum effect. He is satisfied thus far with the effects of testosterone therapy. Now on testosterone 80 mg IM every 12 days. Last injection 1 day ago. Will check peak testosterone level today and estradiol level as well and adjust testosterone as needed for goal trough testosterone of 300. He will consider testosterone pellets in the future. Now s/p top surgery with Dr. Pena and TERRELL with Dr. Santoyo. Will need continued monitoring for cancer screening in the future such as mammography, colonoscopy. No plans for pregnancy. He does not want to entertain egg freezing or fertility options. Mental health follow-up recommended.

## 2020-03-02 NOTE — REVIEW OF SYSTEMS
[Depression] : depression [All other systems negative] : All other systems negative [Recent Weight Gain (___ Lbs)] : no recent weight gain [Chills] : no chills [Fever] : no fever [Recent Weight Loss (___ Lbs)] : no recent weight loss [Blurry Vision] : no blurred vision [Dysphagia] : no dysphagia [Dysphonia] : no dysphonia [Neck Pain] : no neck pain [Palpitations] : no palpitations [Chest Pain] : no chest pain [Lower Ext Edema] : no lower extremity edema [Cough] : no cough [Shortness Of Breath] : no shortness of breath [Vomiting] : no vomiting was observed [Nausea] : no nausea [Constipation] : no constipation [Diarrhea] : no diarrhea [Abdominal Pain] : no abdominal pain [Myalgia] : no myalgia  [Joint Pain] : no joint pain [Headache] : no headaches [Tremors] : no tremors [Anxiety] : no anxiety

## 2020-03-11 LAB
ALBUMIN SERPL ELPH-MCNC: 4.9 G/DL
ALP BLD-CCNC: 72 U/L
ALT SERPL-CCNC: 21 U/L
ANION GAP SERPL CALC-SCNC: 13 MMOL/L
AST SERPL-CCNC: 28 U/L
BASOPHILS # BLD AUTO: 0.03 K/UL
BASOPHILS NFR BLD AUTO: 0.4 %
BILIRUB SERPL-MCNC: 0.4 MG/DL
BUN SERPL-MCNC: 12 MG/DL
CALCIUM SERPL-MCNC: 9.5 MG/DL
CHLORIDE SERPL-SCNC: 100 MMOL/L
CO2 SERPL-SCNC: 28 MMOL/L
CREAT SERPL-MCNC: 0.99 MG/DL
EOSINOPHIL # BLD AUTO: 0.09 K/UL
EOSINOPHIL NFR BLD AUTO: 1.3 %
ESTRADIOL SERPL-MCNC: 7 PG/ML
GLUCOSE SERPL-MCNC: 87 MG/DL
HCT VFR BLD CALC: 46 %
HGB BLD-MCNC: 14.8 G/DL
IMM GRANULOCYTES NFR BLD AUTO: 0.3 %
LYMPHOCYTES # BLD AUTO: 1.95 K/UL
LYMPHOCYTES NFR BLD AUTO: 27.3 %
MAN DIFF?: NORMAL
MCHC RBC-ENTMCNC: 28.6 PG
MCHC RBC-ENTMCNC: 32.2 GM/DL
MCV RBC AUTO: 89 FL
MONOCYTES # BLD AUTO: 0.55 K/UL
MONOCYTES NFR BLD AUTO: 7.7 %
NEUTROPHILS # BLD AUTO: 4.51 K/UL
NEUTROPHILS NFR BLD AUTO: 63 %
PLATELET # BLD AUTO: 231 K/UL
POTASSIUM SERPL-SCNC: 4.2 MMOL/L
PROT SERPL-MCNC: 7.9 G/DL
RBC # BLD: 5.17 M/UL
RBC # FLD: 12.9 %
SODIUM SERPL-SCNC: 142 MMOL/L
TESTOST SERPL-MCNC: 561 NG/DL
WBC # FLD AUTO: 7.15 K/UL

## 2020-03-23 ENCOUNTER — TRANSCRIPTION ENCOUNTER (OUTPATIENT)
Age: 20
End: 2020-03-23

## 2020-04-01 NOTE — ADDENDUM
[FreeTextEntry1] : I, Rohini Malhotra, acted solely as a scribe for Dr. Yan Pena on this date 1/8/19.

## 2020-04-01 NOTE — HISTORY OF PRESENT ILLNESS
[FreeTextEntry1] : 18 year old transgender male presents s/p top surgery on 12/19/18, presents with a compression binder.\par He is pleased with his cosmetic result.\par He reports clear drainage bilaterally.

## 2020-04-20 ENCOUNTER — TRANSCRIPTION ENCOUNTER (OUTPATIENT)
Age: 20
End: 2020-04-20

## 2020-06-04 ENCOUNTER — RX RENEWAL (OUTPATIENT)
Age: 20
End: 2020-06-04

## 2020-06-11 ENCOUNTER — TRANSCRIPTION ENCOUNTER (OUTPATIENT)
Age: 20
End: 2020-06-11

## 2020-06-29 ENCOUNTER — TRANSCRIPTION ENCOUNTER (OUTPATIENT)
Age: 20
End: 2020-06-29

## 2020-07-13 ENCOUNTER — APPOINTMENT (OUTPATIENT)
Dept: ENDOCRINOLOGY | Facility: CLINIC | Age: 20
End: 2020-07-13
Payer: COMMERCIAL

## 2020-07-13 VITALS
WEIGHT: 130 LBS | TEMPERATURE: 98.5 F | DIASTOLIC BLOOD PRESSURE: 80 MMHG | SYSTOLIC BLOOD PRESSURE: 126 MMHG | BODY MASS INDEX: 21.66 KG/M2 | OXYGEN SATURATION: 98 % | HEIGHT: 65 IN | HEART RATE: 68 BPM

## 2020-07-13 PROCEDURE — 99213 OFFICE O/P EST LOW 20 MIN: CPT

## 2020-07-14 LAB
ALBUMIN SERPL ELPH-MCNC: 5.1 G/DL
ALP BLD-CCNC: 53 U/L
ALT SERPL-CCNC: 18 U/L
ANION GAP SERPL CALC-SCNC: 14 MMOL/L
AST SERPL-CCNC: 29 U/L
BASOPHILS # BLD AUTO: 0.02 K/UL
BASOPHILS NFR BLD AUTO: 0.3 %
BILIRUB SERPL-MCNC: 0.6 MG/DL
BUN SERPL-MCNC: 14 MG/DL
CALCIUM SERPL-MCNC: 9.7 MG/DL
CHLORIDE SERPL-SCNC: 101 MMOL/L
CHOLEST SERPL-MCNC: 150 MG/DL
CHOLEST/HDLC SERPL: 2.9 RATIO
CO2 SERPL-SCNC: 27 MMOL/L
CREAT SERPL-MCNC: 0.87 MG/DL
EOSINOPHIL # BLD AUTO: 0.12 K/UL
EOSINOPHIL NFR BLD AUTO: 1.9 %
ESTRADIOL SERPL-MCNC: 25 PG/ML
GLUCOSE SERPL-MCNC: 77 MG/DL
HCT VFR BLD CALC: 49.3 %
HDLC SERPL-MCNC: 52 MG/DL
HGB BLD-MCNC: 15.3 G/DL
IMM GRANULOCYTES NFR BLD AUTO: 0.3 %
LDLC SERPL CALC-MCNC: 87 MG/DL
LYMPHOCYTES # BLD AUTO: 2.07 K/UL
LYMPHOCYTES NFR BLD AUTO: 32.2 %
MAN DIFF?: NORMAL
MCHC RBC-ENTMCNC: 28.6 PG
MCHC RBC-ENTMCNC: 31 GM/DL
MCV RBC AUTO: 92.1 FL
MONOCYTES # BLD AUTO: 0.74 K/UL
MONOCYTES NFR BLD AUTO: 11.5 %
NEUTROPHILS # BLD AUTO: 3.46 K/UL
NEUTROPHILS NFR BLD AUTO: 53.8 %
PLATELET # BLD AUTO: 204 K/UL
POTASSIUM SERPL-SCNC: 4.3 MMOL/L
PROT SERPL-MCNC: 7.5 G/DL
RBC # BLD: 5.35 M/UL
RBC # FLD: 12.9 %
SODIUM SERPL-SCNC: 143 MMOL/L
TESTOST SERPL-MCNC: 634 NG/DL
TRIGL SERPL-MCNC: 54 MG/DL
WBC # FLD AUTO: 6.43 K/UL

## 2020-07-14 NOTE — ASSESSMENT
[FreeTextEntry1] : 20 y/o trans M with gender dysphoria here for hormone male transition- Discussed the treatment regimen of testosterone and risks and benefits. He is aware of risk of polycythemia, CVA, CAD, liver effects, and worsening underlying psychiatric disease possibly with testosterone use. Also discussed benefits such as facial and body hair growth, body fat redistribution, lack of periods, clitorimegaly, deeper voice etc. He is aware the effects can take anywhere from 3-6 months for onset and 2 years for maximum effect. He is satisfied thus far with the effects of testosterone therapy. Now on testosterone 120 mg IM every 12 days. Last injection 2 days ago. Will check peak testosterone level today and estradiol level as well and adjust testosterone as needed for goal trough testosterone of 300. He will consider testosterone pellets in the future. Now s/p top surgery with Dr. Pena and TERRELL with Dr. Santoyo. Will need continued monitoring for cancer screening in the future such as mammography, colonoscopy. No plans for pregnancy. He does not want to entertain egg freezing or fertility options. Mental health follow-up recommended.

## 2020-07-14 NOTE — HISTORY OF PRESENT ILLNESS
[FreeTextEntry1] : PAVITHRA HOWARD is a 19 year old, transmale here for transgender hormone management. \par \par Pronouns: He/ Him / His \par Sexual orientation: mostly straight\par Gender identity: Male\par \par Transition history (Social, Endo, Surgical): When the patient was little, he felt he was a boy. Hated dolls and girls stuff, wanted a penis when he was [age] years old. Interested in doing all masculine things. Identical twin sister (Twin A) is supportive. Always liked and done different things. Sister supportive of the patient's transition. When he was in the women's restroom since starting college realized that he didn't want to be a women any longer. Told older sister months before coming out that he was a delmi. Spoke with parents about coming out first. When he told his mom (early Dec 2017), she intially shut down the conversation and her dad didn't understand (Nov 2017), but they let her come out publically and have "come around' and are supportive. Since then, they are supportive, and call the patient by "Pavithra" but are still mis-gendering the patient with pronouns. Came out publically in early Dec 2017.\par \par Started on testosterone 6/18/18 100 mg every 2 weeks. Now on testosterone 120 mg every 12 days as he was still having menses. Last injection 2 days ago. Injects into buttocks. No issues with injections. Since starting testosterone, has noticed voice changes (deeper voice), bottom growth, some increased hair growth but not as much as he would like, clitoromegaly. Has increased libido. Has also had worsening acne, especially over the shoulders and upper arms. Has been using clindamycin cream. He states that he would like more hair growth and a deeper voice. He has had dysphoria about chest, and has now undergone top surgery 12/2018 periareolar with Dr. Pena and is satisfied with the results. Has seen gyn now s/p hysterectomy with Dr. Santoyo. At this time and not interested in egg freezing. At this time, he does not have headaches, blurry vision, chest pain, palpitations, abdominal pain, nausea, vomiting, diarrhea, constipation, leg/calf pain, lower extremity edema. \par \par Education: Pt attends [employer] studying music/performance. High school: [employer]. 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 Escobar. - Psychiatrist: Dr. Edwin Bran ([facility] ) Pt has seen provider since June 2017, sessions are monthly. Happy to write letter. - Psychologist: Dr. Sri Quintero ( [facility] ) Pt has seen provider since May 2017, sessions are weekly. Provider is more on board with pt's transition than psychiatrist, according to pt's mother. Pt has more a relationship with Dr. Quintero. - PCP: Dr. Jordon Abdullahi, Gen Peds\par Hospital admission: 15 y/o allergic reaction to seseme seeds; pt had tongue swelling and lip swelling and gave himself epipen and got whole body hives. Follow with Dr. De Santiago for allergy. Last seen years ago. 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 Now follows with Dr. Serrano at [facility]. Food Allegy: avoiding tree nuts and all seeds. Also avoids peanuts out of an abundance of caution.Asthma: none Pollen allergies: spring mostly and some in the fall. Sometime take claritin. Helps. Drug Allergy: Amox/PCN - hives over whole body, last at age 3 years old. \par

## 2020-07-14 NOTE — REVIEW OF SYSTEMS
[Fever] : no fever [Chills] : no chills [Blurry Vision] : no blurred vision [Neck Pain] : no neck pain [Lower Ext Edema] : no lower extremity edema [Cough] : no cough [Abdominal Pain] : no abdominal pain [Myalgia] : no myalgia  [Headache] : no headaches [Anxiety] : no anxiety [Fatigue] : no fatigue [Recent Weight Gain (___ Lbs)] : no recent weight gain [Recent Weight Loss (___ Lbs)] : no recent weight loss [Visual Field Defect] : no visual field defect [Dysphagia] : no dysphagia [Dysphonia] : no dysphonia [Chest Pain] : no chest pain [Palpitations] : no palpitations [Shortness Of Breath] : no shortness of breath [Nausea] : no nausea [Constipation] : no constipation [Vomiting] : no vomiting [Diarrhea] : no diarrhea [Polyuria] : no polyuria [Joint Pain] : no joint pain [Headaches] : no headaches [Tremors] : no tremors [Depression] : no depression [Polydipsia] : no polydipsia [Cold Intolerance] : no cold intolerance [Heat Intolerance] : no heat intolerance [All other systems negative] : All other systems negative

## 2020-07-21 ENCOUNTER — TRANSCRIPTION ENCOUNTER (OUTPATIENT)
Age: 20
End: 2020-07-21

## 2020-09-21 ENCOUNTER — TRANSCRIPTION ENCOUNTER (OUTPATIENT)
Age: 20
End: 2020-09-21

## 2020-11-20 ENCOUNTER — TRANSCRIPTION ENCOUNTER (OUTPATIENT)
Age: 20
End: 2020-11-20

## 2020-12-07 ENCOUNTER — TRANSCRIPTION ENCOUNTER (OUTPATIENT)
Age: 20
End: 2020-12-07

## 2020-12-24 ENCOUNTER — TRANSCRIPTION ENCOUNTER (OUTPATIENT)
Age: 20
End: 2020-12-24

## 2021-01-11 ENCOUNTER — APPOINTMENT (OUTPATIENT)
Dept: ENDOCRINOLOGY | Facility: CLINIC | Age: 21
End: 2021-01-11
Payer: COMMERCIAL

## 2021-01-11 VITALS
DIASTOLIC BLOOD PRESSURE: 77 MMHG | HEART RATE: 62 BPM | SYSTOLIC BLOOD PRESSURE: 121 MMHG | WEIGHT: 130 LBS | OXYGEN SATURATION: 98 % | BODY MASS INDEX: 21.66 KG/M2 | TEMPERATURE: 98.2 F | HEIGHT: 65 IN

## 2021-01-11 PROCEDURE — 99213 OFFICE O/P EST LOW 20 MIN: CPT

## 2021-01-11 PROCEDURE — 99072 ADDL SUPL MATRL&STAF TM PHE: CPT

## 2021-01-11 NOTE — DATA REVIEWED
[FreeTextEntry1] : Labs 7/14/2020:\par Testosterone 634\par Estradiol 25\par Lipid Profile normal\par CMP normal\par CBC normal\par \par \par Labs 5/6/19:\par Estradiol 81\par LDL 78\par HDL 44\par Chol 136\par Trig 70\par CMP normal\par Testosterone 237\par \par Labs 1/2019:\par Testosterone 340.8\par Estradiol 40.93\par CMP normal\par CBC normal\par \par Labs 11/2018:\par Estradiol 54.83\par Testosterone 450\par CBC normal \par CMP normal\par \par Labs 7/24/18:\par Hb 13.1\par CMP normal\par Estradiol 39\par Testosterone 476.5\par \par Labs 3/2018:\par CBC normal\par LDL 82\par HDL 54\par Chol 145\par Trig 46\par CMP normal\par TSH 2.29\par Testosterone 35.2\par Estradiol 33\par Vit D 29.7\par DHEAS 204\par HIV nonreactive

## 2021-01-11 NOTE — ASSESSMENT
[FreeTextEntry1] : 19 y/o trans M with gender dysphoria here for hormone male transition- Discussed the treatment regimen of testosterone and risks and benefits. He is aware of risk of polycythemia, CVA, CAD, liver effects, and worsening underlying psychiatric disease possibly with testosterone use. Also discussed benefits such as facial and body hair growth, body fat redistribution, lack of periods, clitorimegaly, deeper voice etc. He is aware the effects can take anywhere from 3-6 months for onset and 2 years for maximum effect. He is satisfied thus far with the effects of testosterone therapy. Now on testosterone 80 mg IM every 12 days. Last injection 4 days ago. Will check peak testosterone level today and estradiol level as well and adjust testosterone as needed for goal peak testosterone of 700. He will consider testosterone pellets in the future. Now s/p top surgery with Dr. Pena and TERRELL with Dr. Santoyo. Will need continued monitoring for cancer screening in the future such as mammography, colonoscopy. No plans for pregnancy. He does not want to entertain egg freezing or fertility options. Mental health follow-up recommended.

## 2021-01-11 NOTE — HISTORY OF PRESENT ILLNESS
[FreeTextEntry1] : PAVITHRA HOWARD is a 20 year old, transmale here for transgender hormone management. \par \par Pronouns: He/ Him / His \par Sexual orientation: mostly straight\par Gender identity: Male\par Name and Gender Marker Change: Done\par \par Transition history (Social, Endo, Surgical): When the patient was little, he felt he was a boy. Hated dolls and girls stuff, wanted a penis when he was [age] years old. Interested in doing all masculine things. Identical twin sister (Twin A) is supportive. Always liked and done different things. Sister supportive of the patient's transition. When he was in the women's restroom since starting college realized that he didn't want to be a women any longer. Told older sister months before coming out that he was a delmi. Spoke with parents about coming out first. When he told his mom (early Dec 2017), she intially shut down the conversation and her dad didn't understand (Nov 2017), but they let her come out publically and have "come around' and are supportive. Since then, they are supportive, and call the patient by "Pavithra" but are still mis-gendering the patient with pronouns. Came out publically in early Dec 2017.\par \par Started on testosterone 6/18/18 100 mg every 2 weeks. Now on testosterone 80 mg every 12 days. Last injection 4 days ago. Injects into buttocks. No issues with injections. Since starting testosterone, has noticed voice changes (deeper voice), bottom growth, some increased hair growth but not as much as he would like, clitoromegaly. Has increased libido. Has also had worsening acne, especially over the shoulders and upper arms. Has been using clindamycin cream. He states that he would like more hair growth and a deeper voice. He has had dysphoria about chest, and has now undergone top surgery 12/2018 periareolar with Dr. Pena and is satisfied with the results. Has seen gyn now s/p hysterectomy with Dr. Santoyo. At this time and not interested in egg freezing. At this time, he does not have headaches, blurry vision, chest pain, palpitations, abdominal pain, nausea, vomiting, diarrhea, constipation, leg/calf pain, lower extremity edema. \par \par Education: Pt attended [employer], studying music/performance. High school: [employer]. 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 Escobar. - Psychiatrist: Dr. Edwin Bran ([facility] ) Pt has seen provider since June 2017, sessions are monthly. Happy to write letter. - Psychologist: Dr. Sri Quintero ( [facility] ) Pt has seen provider since May 2017, sessions are weekly. Provider is more on board with pt's transition than psychiatrist, according to pt's mother. Pt has more a relationship with Dr. Quintero. - PCP: Gen Anthony Fraire\par Hospital admission: 15 y/o allergic reaction to seseme seeds; pt had tongue swelling and lip swelling and gave himself epipen and got whole body hives. Follow with Dr. De Santiago for allergy. Last seen years ago. 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 Now follows with Dr. Serrano at [facility]. Food Allegy: avoiding tree nuts and all seeds. Also avoids peanuts out of an abundance of caution.Asthma: none Pollen allergies: spring mostly and some in the fall. Sometime take claritin. Helps. Drug Allergy: Amox/PCN - hives over whole body, last at age 3 years old. \par

## 2021-01-11 NOTE — PHYSICAL EXAM
[Alert] : alert [Well Nourished] : well nourished [Healthy Appearance] : healthy appearance [No Acute Distress] : no acute distress [Well Developed] : well developed [No Proptosis] : no proptosis [Normal Oropharynx] : the oropharynx was normal [No Respiratory Distress] : no respiratory distress [No Accessory Muscle Use] : no accessory muscle use [Normal Rate and Effort] : normal respiratory rate and effort [Clear to Auscultation] : lungs were clear to auscultation bilaterally [Normal S1, S2] : normal S1 and S2 [Normal Rate] : heart rate was normal [Regular Rhythm] : with a regular rhythm [No Edema] : no peripheral edema [Normal Bowel Sounds] : normal bowel sounds [Not Tender] : non-tender [Not Distended] : not distended [Soft] : abdomen soft [Oriented x3] : oriented to person, place, and time [Normal Affect] : the affect was normal [Normal Mood] : the mood was normal

## 2021-01-11 NOTE — REVIEW OF SYSTEMS
[All other systems negative] : All other systems negative [Fatigue] : no fatigue [Recent Weight Gain (___ Lbs)] : no recent weight gain [Recent Weight Loss (___ Lbs)] : no recent weight loss [Visual Field Defect] : no visual field defect [Dysphagia] : no dysphagia [Dysphonia] : no dysphonia [Chest Pain] : no chest pain [Palpitations] : no palpitations [Shortness Of Breath] : no shortness of breath [Nausea] : no nausea [Constipation] : no constipation [Vomiting] : no vomiting [Diarrhea] : no diarrhea [Polyuria] : no polyuria [Joint Pain] : no joint pain [Headaches] : no headaches [Tremors] : no tremors [Depression] : no depression [Polydipsia] : no polydipsia [Cold Intolerance] : no cold intolerance [Heat Intolerance] : no heat intolerance

## 2021-01-27 ENCOUNTER — TRANSCRIPTION ENCOUNTER (OUTPATIENT)
Age: 21
End: 2021-01-27

## 2021-01-29 LAB
ALBUMIN SERPL ELPH-MCNC: 5 G/DL
ALP BLD-CCNC: 58 U/L
ALT SERPL-CCNC: 16 U/L
ANION GAP SERPL CALC-SCNC: 14 MMOL/L
AST SERPL-CCNC: 20 U/L
BASOPHILS # BLD AUTO: 0.03 K/UL
BASOPHILS NFR BLD AUTO: 0.5 %
BILIRUB SERPL-MCNC: 0.6 MG/DL
BUN SERPL-MCNC: 14 MG/DL
CALCIUM SERPL-MCNC: 9.9 MG/DL
CHLORIDE SERPL-SCNC: 103 MMOL/L
CO2 SERPL-SCNC: 27 MMOL/L
CREAT SERPL-MCNC: 0.93 MG/DL
EOSINOPHIL # BLD AUTO: 0.09 K/UL
EOSINOPHIL NFR BLD AUTO: 1.5 %
GLUCOSE SERPL-MCNC: 93 MG/DL
HCT VFR BLD CALC: 47.5 %
HGB BLD-MCNC: 15.1 G/DL
IMM GRANULOCYTES NFR BLD AUTO: 0.2 %
LYMPHOCYTES # BLD AUTO: 1.94 K/UL
LYMPHOCYTES NFR BLD AUTO: 32.7 %
MAN DIFF?: NORMAL
MCHC RBC-ENTMCNC: 28.2 PG
MCHC RBC-ENTMCNC: 31.8 GM/DL
MCV RBC AUTO: 88.8 FL
MONOCYTES # BLD AUTO: 0.45 K/UL
MONOCYTES NFR BLD AUTO: 7.6 %
NEUTROPHILS # BLD AUTO: 3.41 K/UL
NEUTROPHILS NFR BLD AUTO: 57.5 %
PLATELET # BLD AUTO: 215 K/UL
POTASSIUM SERPL-SCNC: 4 MMOL/L
PROT SERPL-MCNC: 7.3 G/DL
RBC # BLD: 5.35 M/UL
RBC # FLD: 12.3 %
SHBG SERPL-SCNC: 29.5 NMOL/L
SODIUM SERPL-SCNC: 144 MMOL/L
TESTOST SERPL-MCNC: 666 NG/DL
WBC # FLD AUTO: 5.93 K/UL

## 2021-02-04 ENCOUNTER — TRANSCRIPTION ENCOUNTER (OUTPATIENT)
Age: 21
End: 2021-02-04

## 2021-02-07 ENCOUNTER — TRANSCRIPTION ENCOUNTER (OUTPATIENT)
Age: 21
End: 2021-02-07

## 2021-03-23 ENCOUNTER — TRANSCRIPTION ENCOUNTER (OUTPATIENT)
Age: 21
End: 2021-03-23

## 2021-03-24 ENCOUNTER — TRANSCRIPTION ENCOUNTER (OUTPATIENT)
Age: 21
End: 2021-03-24

## 2021-04-02 ENCOUNTER — APPOINTMENT (OUTPATIENT)
Dept: OBGYN | Facility: CLINIC | Age: 21
End: 2021-04-02
Payer: COMMERCIAL

## 2021-04-02 VITALS
TEMPERATURE: 97.1 F | WEIGHT: 135 LBS | SYSTOLIC BLOOD PRESSURE: 132 MMHG | DIASTOLIC BLOOD PRESSURE: 72 MMHG | BODY MASS INDEX: 22.49 KG/M2 | HEIGHT: 65 IN

## 2021-04-02 DIAGNOSIS — N93.0 POSTCOITAL AND CONTACT BLEEDING: ICD-10-CM

## 2021-04-02 PROCEDURE — 99214 OFFICE O/P EST MOD 30 MIN: CPT

## 2021-04-02 PROCEDURE — 99072 ADDL SUPL MATRL&STAF TM PHE: CPT

## 2021-04-02 NOTE — PHYSICAL EXAM
[Appropriately responsive] : appropriately responsive [Alert] : alert [No Acute Distress] : no acute distress [Oriented x3] : oriented x3 [Vulvar Atrophy] : vulvar atrophy [Labia Majora] : normal [Labia Minora] : normal [Normal] : normal [Atrophy] : atrophy [No Bleeding] : There was no active vaginal bleeding

## 2021-04-02 NOTE — HISTORY OF PRESENT ILLNESS
[FreeTextEntry1] : 21 yo G0 AFAB presenting for evaluation of vaginal bleeding during attempted vaginal intercourse.  He is s/p periareolar TOP surgery by Dr. Pena and TERRELL by Dr. Herrera \par Currently on testosterone therapy, managed by endocrinologist. \par \par pt attempted vaginal sex with a penis (condom on) a few weeks ago and had some bleeding. He has never had penetrative vaginal sex with penis previously. Pt has had digital penetration in past, he has not used tampons in past.

## 2021-04-12 ENCOUNTER — APPOINTMENT (OUTPATIENT)
Dept: ENDOCRINOLOGY | Facility: CLINIC | Age: 21
End: 2021-04-12
Payer: COMMERCIAL

## 2021-04-12 VITALS
HEIGHT: 65 IN | BODY MASS INDEX: 21.99 KG/M2 | TEMPERATURE: 98.2 F | WEIGHT: 132 LBS | SYSTOLIC BLOOD PRESSURE: 143 MMHG | RESPIRATION RATE: 17 BRPM | DIASTOLIC BLOOD PRESSURE: 72 MMHG | HEART RATE: 67 BPM | OXYGEN SATURATION: 98 %

## 2021-04-12 PROCEDURE — 99072 ADDL SUPL MATRL&STAF TM PHE: CPT

## 2021-04-12 PROCEDURE — 99214 OFFICE O/P EST MOD 30 MIN: CPT

## 2021-04-12 NOTE — ASSESSMENT
[FreeTextEntry1] : 19 y/o trans M with gender dysphoria here for hormone male transition- Discussed the treatment regimen of testosterone and risks and benefits. He is aware of risk of polycythemia, CVA, CAD, liver effects, and worsening underlying psychiatric disease possibly with testosterone use. Also discussed benefits such as facial and body hair growth, body fat redistribution, lack of periods, clitorimegaly, deeper voice etc. He is aware the effects can take anywhere from 3-6 months for onset and 2 years for maximum effect. He is satisfied thus far with the effects of testosterone therapy. Now on testosterone 80 mg IM every 12 days. Last injection 4 days ago. Will check peak testosterone level today and estradiol level as well and adjust testosterone as needed for goal peak testosterone of 700. He will consider testosterone pellets in the future. Now s/p top surgery with Dr. Pena and [facility] with Dr. Santoyo. Will need continued monitoring for cancer screening in the future such as mammography, colonoscopy. No plans for pregnancy. He does not want to entertain egg freezing or fertility options. Mental health follow-up recommended. \par \par Prep time with review of labs and interval progress notes and consultations 5 min\par Discussion with patient regarding hormone regimen with management plan, risks and benefits, treatment options and goals of care answering all patients questions and addressing all concerns 20 min\par Post-visit completion charting and review 5 min\par Total Time 30 min\par \par Specifically causes, evaluation, treatment options, risks, complications, and benefits of available therapies were discussed. Questions were answered.\par \par The submitted E/M billing level for this visit reflects the total time spent on the day of the visit including face-to-face time spent with the patient, non-face-to-face review of medical records and relevant information, documentation, and asynchronous communication with the patient after a visit via phone, email, or patient’s EHR portal after the visit. \par The medical records reviewed are either scanned into the chart or reviewed with the patient using a patient’s electronic medical records portal for patients with records not available to [facility] via electronic transmission platforms from other institutions and labs. \par Time spend counseling and performing coordination of care was also included in determining the appropriate EM billing level.\par \par I have reviewed and verified information regarding the chief complaint and history recorded by the ancillary staff and/or the patient. I have independently reviewed and interpreted tests performed by other physicians and facilities as necessary. \par \par I have discussed with the patient differential diagnosis, reason for auxiliary tests if ordered, risks, benefits, alternatives, and complications of each form of therapy were discussed.

## 2021-04-12 NOTE — DATA REVIEWED
[FreeTextEntry1] : Labs 1/2021:\par Testosterone 665\par SHBG 29.5\par CMP normal\par Hb 15.1\par \par Labs 7/14/2020:\par Testosterone 634\par Estradiol 25\par Lipid Profile normal\par CMP normal\par CBC normal\par \par \par Labs 5/6/19:\par Estradiol 81\par LDL 78\par HDL 44\par Chol 136\par Trig 70\par CMP normal\par Testosterone 237\par \par Labs 1/2019:\par Testosterone 340.8\par Estradiol 40.93\par CMP normal\par CBC normal\par \par Labs 11/2018:\par Estradiol 54.83\par Testosterone 450\par CBC normal \par CMP normal\par \par Labs 7/24/18:\par Hb 13.1\par CMP normal\par Estradiol 39\par Testosterone 476.5\par \par Labs 3/2018:\par CBC normal\par LDL 82\par HDL 54\par Chol 145\par Trig 46\par CMP normal\par TSH 2.29\par Testosterone 35.2\par Estradiol 33\par Vit D 29.7\par DHEAS 204\par HIV nonreactive

## 2021-04-12 NOTE — HISTORY OF PRESENT ILLNESS
[FreeTextEntry1] : PAVITHRA HOWARD is a 20 year old, transmale here for transgender hormone management. \par \par Pronouns: He/ Him / His \par Sexual orientation: mostly straight\par Gender identity: Male\par Name and Gender Marker Change: Done\par \par Transition history (Social, Endo, Surgical): When the patient was little, he felt he was a boy. Hated dolls and girls stuff, wanted a penis when he was [age] years old. Interested in doing all masculine things. Identical twin sister (Twin A) is supportive. Always liked and done different things. Sister supportive of the patient's transition. When he was in the women's restroom since starting college realized that he didn't want to be a women any longer. Told older sister months before coming out that he was a delmi. Spoke with parents about coming out first. When he told his mom (early Dec 2017), she intially shut down the conversation and her dad didn't understand (Nov 2017), but they let her come out publically and have "come around' and are supportive. Since then, they are supportive, and call the patient by "Pavithra" but are still mis-gendering the patient with pronouns. Came out publically in early Dec 2017.\par \par Started on testosterone 6/18/18 100 mg every 2 weeks. Now on testosterone 80 mg every 12 days. Last injection 4 days ago. Injects into buttocks. No issues with injections. Since starting testosterone, has noticed voice changes (deeper voice), bottom growth, some increased hair growth but not as much as he would like, clitoromegaly. Has increased libido. Has also had worsening acne, especially over the shoulders and upper arms. Has been using clindamycin cream. He states that he would like more hair growth and a deeper voice. He has had dysphoria about chest, and has now undergone top surgery 12/2018 periareolar with Dr. Pena and is satisfied with the results. Has seen gyn now s/p hysterectomy with Dr. Santoyo. At this time and not interested in egg freezing. At this time, he does not have headaches, blurry vision, chest pain, palpitations, abdominal pain, nausea, vomiting, diarrhea, constipation, leg/calf pain, lower extremity edema. \par Seen by Dr. Escobar 4/2021 for vaginal bleeding s/p intercourse recommended estrace cream. \par \par Education: Pt attended [employer], studying music/performance. High school: [email or web address]. 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 Edwin Bran ([facility] ) Pt has seen provider since June 2017, sessions are monthly. Happy to write letter. - Psychologist: Dr. Sri Quintero ( [facility] ) Pt has seen provider since May 2017, sessions are weekly. Provider is more on board with pt's transition than psychiatrist, according to pt's mother. \par

## 2021-04-20 ENCOUNTER — TRANSCRIPTION ENCOUNTER (OUTPATIENT)
Age: 21
End: 2021-04-20

## 2021-04-27 LAB
ALBUMIN SERPL ELPH-MCNC: 4.8 G/DL
ALP BLD-CCNC: 64 U/L
ALT SERPL-CCNC: 16 U/L
ANION GAP SERPL CALC-SCNC: 11 MMOL/L
AST SERPL-CCNC: 22 U/L
BASOPHILS # BLD AUTO: 0.04 K/UL
BASOPHILS NFR BLD AUTO: 0.8 %
BILIRUB SERPL-MCNC: 0.3 MG/DL
BUN SERPL-MCNC: 12 MG/DL
CALCIUM SERPL-MCNC: 9.6 MG/DL
CHLORIDE SERPL-SCNC: 103 MMOL/L
CO2 SERPL-SCNC: 29 MMOL/L
CREAT SERPL-MCNC: 0.92 MG/DL
EOSINOPHIL # BLD AUTO: 0.13 K/UL
EOSINOPHIL NFR BLD AUTO: 2.5 %
GLUCOSE SERPL-MCNC: 85 MG/DL
HCT VFR BLD CALC: 46.4 %
HGB BLD-MCNC: 15.1 G/DL
IMM GRANULOCYTES NFR BLD AUTO: 0.2 %
LYMPHOCYTES # BLD AUTO: 2.05 K/UL
LYMPHOCYTES NFR BLD AUTO: 39.2 %
MAN DIFF?: NORMAL
MCHC RBC-ENTMCNC: 29.3 PG
MCHC RBC-ENTMCNC: 32.5 GM/DL
MCV RBC AUTO: 90.1 FL
MONOCYTES # BLD AUTO: 0.49 K/UL
MONOCYTES NFR BLD AUTO: 9.4 %
NEUTROPHILS # BLD AUTO: 2.51 K/UL
NEUTROPHILS NFR BLD AUTO: 47.9 %
PLATELET # BLD AUTO: 224 K/UL
POTASSIUM SERPL-SCNC: 4.3 MMOL/L
PROT SERPL-MCNC: 7.2 G/DL
RBC # BLD: 5.15 M/UL
RBC # FLD: 12.7 %
SHBG SERPL-SCNC: 28.6 NMOL/L
SODIUM SERPL-SCNC: 144 MMOL/L
TESTOST SERPL-MCNC: 225 NG/DL
WBC # FLD AUTO: 5.23 K/UL

## 2021-05-04 ENCOUNTER — LABORATORY RESULT (OUTPATIENT)
Age: 21
End: 2021-05-04

## 2021-05-04 ENCOUNTER — APPOINTMENT (OUTPATIENT)
Dept: PEDIATRIC ALLERGY IMMUNOLOGY | Facility: CLINIC | Age: 21
End: 2021-05-04
Payer: COMMERCIAL

## 2021-05-04 VITALS
DIASTOLIC BLOOD PRESSURE: 71 MMHG | HEART RATE: 96 BPM | SYSTOLIC BLOOD PRESSURE: 111 MMHG | TEMPERATURE: 96.3 F | BODY MASS INDEX: 21.19 KG/M2 | OXYGEN SATURATION: 97 % | HEIGHT: 67 IN | WEIGHT: 135 LBS

## 2021-05-04 DIAGNOSIS — Z11.59 ENCOUNTER FOR SCREENING FOR OTHER VIRAL DISEASES: ICD-10-CM

## 2021-05-04 DIAGNOSIS — Z23 ENCOUNTER FOR IMMUNIZATION: ICD-10-CM

## 2021-05-04 DIAGNOSIS — Z83.6 FAMILY HISTORY OF OTHER DISEASES OF THE RESPIRATORY SYSTEM: ICD-10-CM

## 2021-05-04 DIAGNOSIS — Z91.018 ALLERGY TO OTHER FOODS: ICD-10-CM

## 2021-05-04 DIAGNOSIS — Z78.9 OTHER SPECIFIED HEALTH STATUS: ICD-10-CM

## 2021-05-04 PROCEDURE — 90471 IMMUNIZATION ADMIN: CPT

## 2021-05-04 PROCEDURE — 36415 COLL VENOUS BLD VENIPUNCTURE: CPT

## 2021-05-04 PROCEDURE — 99072 ADDL SUPL MATRL&STAF TM PHE: CPT

## 2021-05-04 PROCEDURE — 90715 TDAP VACCINE 7 YRS/> IM: CPT

## 2021-05-04 PROCEDURE — 99215 OFFICE O/P EST HI 40 MIN: CPT | Mod: 25

## 2021-05-04 PROCEDURE — 95004 PERQ TESTS W/ALRGNC XTRCS: CPT

## 2021-05-04 NOTE — IMPRESSION
[Allergy Testing Weeds] : weeds [Allergy Testing Dust Mite] : dust mites [Allergy Testing Mixed Feathers] : feathers [Allergy Testing Cockroach] : cockroach [Allergy Testing Dog] : dog [Allergy Testing Cat] : cat [Allergy Testing Trees] : trees [Allergy Testing Grasses] : grasses [] : coconut

## 2021-05-04 NOTE — HISTORY OF PRESENT ILLNESS
[de-identified] : PEPE HOWARD is a 20 year old, male seen on 05/04/2021 for  food allergy followup.\par \par Doing well s/p Top Surgery - Dec 2018 - Dr. Pena \par S/P TERRELL/BSO with cervix (Dr. Santoyo) Dec 2019. Pt had post operative bleeding for few months.  Totally resolved now.\par Doing well on hormone affirming therapy.  Saw Dr. Valdes last on April 12, 2021.\par F/U with Dr. Escobar. - last seen in Apr 2, 2021 as well.\par \par Here today with dad. \par Doing well. 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  Having more nasal congestion.  Not treating with anyhting.  \par \par No antinhistamines in 5-7 days.\par \par Foods avoiding: tree nuts, seed (seseme, poppy, mustard), coconut.  \par Last exposure: 2015 - likely seseme seeds - woke up after dinner int he middle of the night, swelling of lips and tongue, sued Epipen, went to ED.  Rash over entire body.  Had 2 doses of steroids in the ED.  \par Used to go to an allergist in [facility] - Dr. Yobany De Santiago then in [facility].\par \par Symptoms of eye itchiness when outside.  Also going into and out of houses with cats.  \par  \par Previous partner: transfemale.   Insertive sex with condom. \par New partner: cis-male - insertive sex with a condom. No anal. Oral(+)\par

## 2021-05-04 NOTE — REASON FOR VISIT
[Routine Follow-Up] : a routine follow-up visit for [To Food] : allergy to food [FreeTextEntry3] : Trans, STI testing, immunization update

## 2021-05-04 NOTE — SOCIAL HISTORY
[Sexually Active] : The patient is sexually active [Monogamous] : monogamous [Always] : always [Vaginal] : vaginal [Oral-Receptive (pt. mouth)] : oral-receptive (pt. mouth) [Male ___] : [unfilled] male [Both ___] : [unfilled] male and female [Partnership for Health – Safe Sex Evidence Based Intervention] : The Partnership for Health Safe Sex Evidence Based Intervention was applied [Positive Reinforcement of Safe Behavior Message] : and a positive reinforcement of safe behavior message was provided. [de-identified] : NO

## 2021-05-05 LAB
C TRACH RRNA SPEC QL NAA+PROBE: NOT DETECTED
N GONORRHOEA RRNA SPEC QL NAA+PROBE: NOT DETECTED
SOURCE AMPLIFICATION: NORMAL

## 2021-05-06 LAB
25(OH)D3 SERPL-MCNC: 33.8 NG/ML
ALBUMIN SERPL ELPH-MCNC: 5.4 G/DL
ALP BLD-CCNC: 72 U/L
ALT SERPL-CCNC: 20 U/L
ANION GAP SERPL CALC-SCNC: 16 MMOL/L
APPEARANCE: CLEAR
AST SERPL-CCNC: 29 U/L
BASOPHILS # BLD AUTO: 0.04 K/UL
BASOPHILS NFR BLD AUTO: 0.7 %
BILIRUB SERPL-MCNC: 0.6 MG/DL
BILIRUBIN URINE: NEGATIVE
BLOOD URINE: NEGATIVE
BUN SERPL-MCNC: 10 MG/DL
C TRACH RRNA SPEC QL NAA+PROBE: NOT DETECTED
CALCIUM SERPL-MCNC: 10.1 MG/DL
CHLORIDE SERPL-SCNC: 101 MMOL/L
CHOLEST SERPL-MCNC: 173 MG/DL
CO2 SERPL-SCNC: 27 MMOL/L
COLOR: NORMAL
CREAT SERPL-MCNC: 0.92 MG/DL
EOSINOPHIL # BLD AUTO: 0.07 K/UL
EOSINOPHIL NFR BLD AUTO: 1.2 %
ESTIMATED AVERAGE GLUCOSE: 103 MG/DL
GLUCOSE QUALITATIVE U: NEGATIVE
GLUCOSE SERPL-MCNC: 87 MG/DL
HBA1C MFR BLD HPLC: 5.2 %
HBV CORE IGG+IGM SER QL: NONREACTIVE
HBV SURFACE AB SERPL IA-ACNC: 5.5 MIU/ML
HBV SURFACE AG SER QL: NONREACTIVE
HCT VFR BLD CALC: 47.7 %
HCV AB SER QL: NONREACTIVE
HCV S/CO RATIO: 0.18 S/CO
HDLC SERPL-MCNC: 56 MG/DL
HGB BLD-MCNC: 15.1 G/DL
HIV1+2 AB SPEC QL IA.RAPID: NONREACTIVE
IMM GRANULOCYTES NFR BLD AUTO: 0.3 %
KETONES URINE: NEGATIVE
LDLC SERPL CALC-MCNC: 102 MG/DL
LEUKOCYTE ESTERASE URINE: NEGATIVE
LYMPHOCYTES # BLD AUTO: 2.08 K/UL
LYMPHOCYTES NFR BLD AUTO: 34.4 %
MAN DIFF?: NORMAL
MCHC RBC-ENTMCNC: 28.6 PG
MCHC RBC-ENTMCNC: 31.7 GM/DL
MCV RBC AUTO: 90.3 FL
MONOCYTES # BLD AUTO: 0.61 K/UL
MONOCYTES NFR BLD AUTO: 10.1 %
N GONORRHOEA RRNA SPEC QL NAA+PROBE: NOT DETECTED
NEUTROPHILS # BLD AUTO: 3.22 K/UL
NEUTROPHILS NFR BLD AUTO: 53.3 %
NITRITE URINE: NEGATIVE
NONHDLC SERPL-MCNC: 117 MG/DL
PH URINE: 6.5
PLATELET # BLD AUTO: 233 K/UL
POTASSIUM SERPL-SCNC: 4.2 MMOL/L
PROT SERPL-MCNC: 8.2 G/DL
PROTEIN URINE: NEGATIVE
RBC # BLD: 5.28 M/UL
RBC # FLD: 12.9 %
SODIUM SERPL-SCNC: 143 MMOL/L
SOURCE ORAL: NORMAL
SPECIFIC GRAVITY URINE: 1.01
T PALLIDUM AB SER QL IA: NEGATIVE
TESTOST SERPL-MCNC: 414 NG/DL
TRIGL SERPL-MCNC: 78 MG/DL
TSH SERPL-ACNC: 2.24 UIU/ML
UROBILINOGEN URINE: NORMAL
WBC # FLD AUTO: 6.04 K/UL

## 2021-05-07 LAB
ALMOND IGE QN: <0.1 KUA/L
BRAZIL NUT IGE QN: <0.1 KUA/L
CASHEW NUT IGE QN: <0.1 KUA/L
COCONUT IGE QN: 0
COCONUT IGE QN: <0.1 KUA/L
DEPRECATED ALMOND IGE RAST QL: 0
DEPRECATED BRAZIL NUT IGE RAST QL: 0
DEPRECATED CASHEW NUT IGE RAST QL: 0
DEPRECATED HAZELNUT IGE RAST QL: 0
DEPRECATED MUSTARD IGE RAST QL: <0.1 KUA/L
DEPRECATED PEANUT IGE RAST QL: 0
DEPRECATED PECAN/HICK TREE IGE RAST QL: 0
DEPRECATED PINE NUT IGE RAST QL: 0
DEPRECATED PISTACHIO IGE RAST QL: <0.1 KUA/L
DEPRECATED POPPY SEED IGE RAST QL: 0
DEPRECATED SESAME SEED IGE RAST QL: 0
DEPRECATED SUNFLOWER SEED IGE RAST QL: 0
DEPRECATED WALNUT IGE RAST QL: NORMAL
HAZELNUT IGE QN: <0.1 KUA/L
MUSTARD IGE QN: 0
PEANUT IGE QN: <0.1 KUA/L
PECAN/HICK TREE IGE QN: <0.1 KUA/L
PINE NUT IGE QN: <0.1 KUA/L
PISTACHIO IGE QN: 0
POPPY SEED IGE QN: <0.1 KUA/L
SESAME SEED IGE QN: <0.1 KUA/L
SUNFLOWER SEED IGE QN: <0.1 KUA/L
TOTAL IGE SMQN RAST: 38 KU/L
WALNUT IGE QN: 0.2 KUA/L

## 2021-06-01 ENCOUNTER — TRANSCRIPTION ENCOUNTER (OUTPATIENT)
Age: 21
End: 2021-06-01

## 2021-06-02 ENCOUNTER — NON-APPOINTMENT (OUTPATIENT)
Age: 21
End: 2021-06-02

## 2021-06-07 ENCOUNTER — APPOINTMENT (OUTPATIENT)
Dept: TRANSGENDER CARE | Facility: CLINIC | Age: 21
End: 2021-06-07
Payer: COMMERCIAL

## 2021-06-07 VITALS
DIASTOLIC BLOOD PRESSURE: 76 MMHG | HEIGHT: 67 IN | BODY MASS INDEX: 21.19 KG/M2 | TEMPERATURE: 98 F | HEART RATE: 89 BPM | SYSTOLIC BLOOD PRESSURE: 118 MMHG | WEIGHT: 135 LBS | OXYGEN SATURATION: 99 %

## 2021-06-07 DIAGNOSIS — Z23 ENCOUNTER FOR IMMUNIZATION: ICD-10-CM

## 2021-06-07 PROCEDURE — 99215 OFFICE O/P EST HI 40 MIN: CPT | Mod: 25

## 2021-06-07 PROCEDURE — 90471 IMMUNIZATION ADMIN: CPT

## 2021-06-07 PROCEDURE — 99072 ADDL SUPL MATRL&STAF TM PHE: CPT

## 2021-06-07 PROCEDURE — 90746 HEPB VACCINE 3 DOSE ADULT IM: CPT

## 2021-06-07 NOTE — HISTORY OF PRESENT ILLNESS
[FreeTextEntry1] : PEPE HOWARD is a 20 year old, male seen on 06/07/2021 for  Routine Trans f/u.\par \par Pt saw Dr. Valdes on April 12, 2021 and is now doing subQ testosterone abdominal every 10 days, and is doing well with it.  He likes this route of injection better SQ, as its easier fo rhim.\par \par Going to [employer] next weekend.  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  He's throwing the band together, and needs a band name.  He's doing it in the park and has approval from the [employer] for the concerts.  6/16, 7/14, 8/7, 8/21 - usually about 40 people come.  Lots of fun! \par \par Work: doing well. Still [employer] [place].  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 \par \par New Mental health: Paxil tapered down to 10 mg a/p psychiatrist. \par Seeing psychiatrist every few 2-3 months.  Therapist - sees her eveyr 2 weeks.  \par No depression.  Denies suicidal ideation, denies homicidal ideation. \par \par Food allergy - last in 2016 reaction - pt had lip and tounge swelling with Epipen and ED visit and 2nd flare up.  Unknown trigger - may have been sesame seeds.\par Immunocap all negative\par SPT on May 2021 pos for filbert and walnut only. \par Not interested in a oral food challenge now.

## 2021-06-07 NOTE — SOCIAL HISTORY
[Sexually Active] : The patient is sexually active [Monogamous] : monogamous [Always] : always [Vaginal] : vaginal [Oral-Receptive (pt. mouth)] : oral-receptive (pt. mouth) [Male ___] : [unfilled] male [Both ___] : [unfilled] male and female [Partnership for Health – Safe Sex Evidence Based Intervention] : The Partnership for Health Safe Sex Evidence Based Intervention was applied [Positive Reinforcement of Safe Behavior Message] : and a positive reinforcement of safe behavior message was provided. [de-identified] : same partner - started dating in 2019 on and off; on since March 2021 (no penatrive sex recently) [de-identified] : NO

## 2021-06-23 ENCOUNTER — TRANSCRIPTION ENCOUNTER (OUTPATIENT)
Age: 21
End: 2021-06-23

## 2021-07-06 ENCOUNTER — TRANSCRIPTION ENCOUNTER (OUTPATIENT)
Age: 21
End: 2021-07-06

## 2021-09-05 ENCOUNTER — TRANSCRIPTION ENCOUNTER (OUTPATIENT)
Age: 21
End: 2021-09-05

## 2021-09-07 ENCOUNTER — TRANSCRIPTION ENCOUNTER (OUTPATIENT)
Age: 21
End: 2021-09-07

## 2021-09-10 ENCOUNTER — APPOINTMENT (OUTPATIENT)
Dept: TRANSGENDER CARE | Facility: CLINIC | Age: 21
End: 2021-09-10
Payer: COMMERCIAL

## 2021-09-10 DIAGNOSIS — Z11.3 ENCOUNTER FOR SCREENING FOR INFECTIONS WITH A PREDOMINANTLY SEXUAL MODE OF TRANSMISSION: ICD-10-CM

## 2021-09-10 DIAGNOSIS — Z11.4 ENCOUNTER FOR SCREENING FOR HUMAN IMMUNODEFICIENCY VIRUS [HIV]: ICD-10-CM

## 2021-09-10 PROCEDURE — 99214 OFFICE O/P EST MOD 30 MIN: CPT | Mod: 95

## 2021-09-10 RX ORDER — CLINDAMYCIN PHOSPHATE 10 MG/ML
1 LOTION TOPICAL DAILY
Qty: 1 | Refills: 2 | Status: DISCONTINUED | COMMUNITY
Start: 2019-03-15 | End: 2021-09-10

## 2021-09-10 RX ORDER — PAROXETINE HYDROCHLORIDE 10 MG/1
10 TABLET, FILM COATED ORAL DAILY
Refills: 0 | Status: DISCONTINUED | COMMUNITY
End: 2021-09-10

## 2021-09-10 RX ORDER — ESTRADIOL 0.1 MG/G
0.1 CREAM VAGINAL
Qty: 1 | Refills: 5 | Status: DISCONTINUED | COMMUNITY
Start: 2021-04-02 | End: 2021-09-10

## 2021-09-10 RX ORDER — TRETINOIN 0.25 MG/G
0.03 CREAM TOPICAL
Qty: 1 | Refills: 3 | Status: DISCONTINUED | COMMUNITY
Start: 2019-03-15 | End: 2021-09-10

## 2021-09-10 NOTE — HISTORY OF PRESENT ILLNESS
[Home] : at home, [unfilled] , at the time of the visit. [Medical Office: ([facility])___] : at the medical office located in  [Verbal consent obtained from patient] : the patient, [unfilled] [FreeTextEntry1] : PEPE HOWARD is a 20 year old, male seen on 09/10/2021 for  trans care.\par \par Pt had a rapid test test for COVID which was positive on tuesday, 9/7/21, with PCR negative for COVID19 on the sam eday.  Pt was exposed to soemoen who he saw over the wekeend that was positive, and so he got tested. \par COVID-19 Symptom screening: no fever, nasal congestion, cough, sob, loss of smell/taste, or diarrhea. \par \par  Had a great summer. \par \par No complaints. \par had labs drawn at Dr. Valdes.\par Needs Hep B booster #2. \par \par [facility]ed [employer] [facility] this summer.    \par \par [facility] Met[facility] health: Paxil stopped for one month.  Feeling well . During tapering off, he didn' like it.  Seeing psychiatrist every 3 months, Still seeing therist every 2 weeks.  No depression.\par \par No exposure to allergic foods (avoiding tree nuts and seeds).  Not intersted in oral food challenge. Last reaction was in 2016. \par \par

## 2021-09-10 NOTE — SOCIAL HISTORY
[Sexually Active] : The patient is sexually active [Monogamous] : monogamous [Always] : always [Vaginal] : vaginal [Oral-Receptive (pt. mouth)] : oral-receptive (pt. mouth) [Male ___] : [unfilled] male [Both ___] : [unfilled] male and female [Partnership for Health – Safe Sex Evidence Based Intervention] : The Partnership for Health Safe Sex Evidence Based Intervention was applied [Positive Reinforcement of Safe Behavior Message] : and a positive reinforcement of safe behavior message was provided. [de-identified] : none [de-identified] : No partners since June 2021 ; no sex since then.

## 2021-09-20 ENCOUNTER — APPOINTMENT (OUTPATIENT)
Dept: TRANSGENDER CARE | Facility: CLINIC | Age: 21
End: 2021-09-20
Payer: COMMERCIAL

## 2021-09-20 ENCOUNTER — NON-APPOINTMENT (OUTPATIENT)
Age: 21
End: 2021-09-20

## 2021-09-20 VITALS
DIASTOLIC BLOOD PRESSURE: 70 MMHG | HEIGHT: 67 IN | OXYGEN SATURATION: 98 % | HEART RATE: 70 BPM | SYSTOLIC BLOOD PRESSURE: 122 MMHG | WEIGHT: 126 LBS | BODY MASS INDEX: 19.78 KG/M2 | RESPIRATION RATE: 16 BRPM | TEMPERATURE: 97.9 F

## 2021-09-20 PROCEDURE — 90656 IIV3 VACC NO PRSV 0.5 ML IM: CPT

## 2021-09-20 PROCEDURE — G0008: CPT

## 2021-09-23 NOTE — PHYSICAL EXAM
[Alert] : alert [Well Nourished] : well nourished [Healthy Appearance] : healthy appearance [No Acute Distress] : no acute distress [Well Developed] : well developed [Normal Pupil & Iris Size/Symmetry] : normal pupil and iris size and symmetry [Normal Voice/Communication] : normal voice communication [No Discharge] : no discharge [No Photophobia] : no photophobia [Normal Outer Ear/Nose] : the ears and nose were normal in appearance [No Nasal Discharge] : no nasal discharge [Normal Tonsils] : normal tonsils [No Thrush] : no thrush [No Neck Mass] : no neck mass was observed [No LAD] : no lymphadenopathy [Normal Rate and Effort] : normal respiratory rhythm and effort [Normal Percussion] : normal percussion [No Crackles] : no crackles [No Retractions] : no retractions [Bilateral Audible Breath Sounds] : bilateral audible breath sounds [Normal Rate] : heart rate was normal  [Normal S1, S2] : normal S1 and S2 [No murmur] : no murmur [Regular Rhythm] : with a regular rhythm [No Rubs] : no pericardial rub [Not Tender] : non-tender [Soft] : abdomen soft [Normal Bowel Sounds] : normal bowel sounds [Skin Intact] : skin intact  [No Rash] : no rash [No Skin Lesions] : no skin lesions [Normal Mood] : mood was normal [Normal Affect] : affect was normal [Judgment and Insight Age Appropriate] : judgement and insight is age appropriate [Alert, Awake, Oriented as Age-Appropriate] : alert, awake, oriented as age appropriate

## 2021-10-12 ENCOUNTER — APPOINTMENT (OUTPATIENT)
Dept: ENDOCRINOLOGY | Facility: CLINIC | Age: 21
End: 2021-10-12
Payer: COMMERCIAL

## 2021-10-12 VITALS
HEART RATE: 68 BPM | DIASTOLIC BLOOD PRESSURE: 74 MMHG | SYSTOLIC BLOOD PRESSURE: 143 MMHG | HEIGHT: 67 IN | TEMPERATURE: 98.3 F | OXYGEN SATURATION: 99 % | BODY MASS INDEX: 19.62 KG/M2 | WEIGHT: 125 LBS

## 2021-10-12 PROCEDURE — 99214 OFFICE O/P EST MOD 30 MIN: CPT

## 2021-10-12 NOTE — HISTORY OF PRESENT ILLNESS
[FreeTextEntry1] : PAVITHRA HOWARD is a 20 year old, transmale here for transgender hormone management. \par \par Pronouns: He/ Him / His \par Sexual orientation: mostly straight\par Gender identity: Male\par Name and Gender Marker Change: Done\par \par Transition history (Social, Endo, Surgical): When the patient was little, he felt he was a boy. Hated dolls and girls stuff, wanted a penis when he was [age] years old. Interested in doing all masculine things. Identical twin sister (Twin A) is supportive. Always liked and done different things. Sister supportive of the patient's transition. When he was in the women's restroom since starting college realized that he didn't want to be a women any longer. Told older sister months before coming out that he was a delmi. Spoke with parents about coming out first. When he told his mom (early Dec 2017), she initially shut down the conversation and her dad didn't understand (Nov 2017), but they let her come out publically and have "come around' and are supportive. Since then, they are supportive, and call the patient by "Pavithra" but are still mis-gendering the patient with pronouns. Came out publically in early Dec 2017.\par \par Started on testosterone 6/18/18 100 mg every 2 weeks. Now on testosterone 80 mg every 10-12 days. Last injection 9 days ago. Injects into buttocks. No issues with injections. Since starting testosterone, has noticed voice changes (deeper voice), bottom growth, some increased hair growth but not as much as he would like, clitoromegaly. Has increased libido. Has also had worsening acne, especially over the shoulders and upper arms. Has been using clindamycin cream. He states that he would like more hair growth and a deeper voice. He has had dysphoria about chest, and has now undergone top surgery 12/2018 periareolar with Dr. Pena and is satisfied with the results. Has seen gyn now s/p hysterectomy with Dr. Santoyo. Was not interested in egg freezing. At this time, he does not have headaches, blurry vision, chest pain, palpitations, abdominal pain, nausea, vomiting, diarrhea, constipation, leg/calf pain, lower extremity edema. \par Seen by Dr. Escobar 4/2021 for vaginal bleeding s/p intercourse recommended estrace cream. \par \par Education: Pt attended [employer], studying music/performance. High school: [employer]. 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 Edwin Bran ([facility] ) Pt has seen provider since June 2017, sessions are monthly. Happy to write letter. - Psychologist: Dr. Sri Quintero ( [facility] ) Pt has seen provider since May 2017, sessions are weekly. Provider is more on board with pt's transition than psychiatrist, according to pt's mother. \par

## 2021-10-12 NOTE — DATA REVIEWED
[FreeTextEntry1] : Labs 7/2021:\par Hb 15.7\par CMP normal\par Testosterone 758\par SHBG 26.3\par \par Labs 1/2021:\par Testosterone 665\par SHBG 29.5\par CMP normal\par Hb 15.1\par \par Labs 7/14/2020:\par Testosterone 634\par Estradiol 25\par Lipid Profile normal\par CMP normal\par CBC normal\par \par \par Labs 5/6/19:\par Estradiol 81\par LDL 78\par HDL 44\par Chol 136\par Trig 70\par CMP normal\par Testosterone 237\par \par Labs 1/2019:\par Testosterone 340.8\par Estradiol 40.93\par CMP normal\par CBC normal\par \par Labs 11/2018:\par Estradiol 54.83\par Testosterone 450\par CBC normal \par CMP normal\par \par Labs 7/24/18:\par Hb 13.1\par CMP normal\par Estradiol 39\par Testosterone 476.5\par \par Labs 3/2018:\par CBC normal\par LDL 82\par HDL 54\par Chol 145\par Trig 46\par CMP normal\par TSH 2.29\par Testosterone 35.2\par Estradiol 33\par Vit D 29.7\par DHEAS 204\par HIV nonreactive

## 2021-10-12 NOTE — ASSESSMENT
[FreeTextEntry1] : 19 y/o trans M with gender dysphoria here for hormone male transition- Discussed the treatment regimen of testosterone and risks and benefits. He is aware of risk of polycythemia, CVA, CAD, liver effects, and worsening underlying psychiatric disease possibly with testosterone use. Also discussed benefits such as facial and body hair growth, body fat redistribution, lack of periods, clitorimegaly, deeper voice etc. He is aware the effects can take anywhere from 3-6 months for onset and 2 years for maximum effect. He is satisfied thus far with the effects of testosterone therapy. Now on testosterone 80 mg IM every 10-12 days. Levels 7/2021 at goal. Repeat today and adjust testosterone as needed for goal peak testosterone of 700. He will consider testosterone pellets in the future. Now s/p top surgery with Dr. Pena and [facility] with Dr. Santoyo. Will need continued monitoring for cancer screening in the future such as mammography, colonoscopy. No plans for pregnancy. He did not want to entertain egg freezing or fertility options. Mental health follow-up recommended. \par \par Prep time with review of labs and interval progress notes and consultations 5 min\par Discussion with patient regarding hormone regimen with management plan, risks and benefits, treatment options and goals of care answering all patients questions and addressing all concerns 20 min\par Post-visit completion charting and review 5 min\par Total Time 30 min\par \par Specifically causes, evaluation, treatment options, risks, complications, and benefits of available therapies were discussed. Questions were answered.\par \par The submitted E/M billing level for this visit reflects the total time spent on the day of the visit including face-to-face time spent with the patient, non-face-to-face review of medical records and relevant information, documentation, and asynchronous communication with the patient after a visit via phone, email, or patient’s EHR portal after the visit. \par The medical records reviewed are either scanned into the chart or reviewed with the patient using a patient’s electronic medical records portal for patients with records not available to [facility] via electronic transmission platforms from other institutions and labs. \par Time spend counseling and performing coordination of care was also included in determining the appropriate EM billing level.\par \par I have reviewed and verified information regarding the chief complaint and history recorded by the ancillary staff and/or the patient. I have independently reviewed and interpreted tests performed by other physicians and facilities as necessary. \par \par I have discussed with the patient differential diagnosis, reason for auxiliary tests if ordered, risks, benefits, alternatives, and complications of each form of therapy were discussed.

## 2021-10-12 NOTE — PHYSICAL EXAM
[Alert] : alert [Well Nourished] : well nourished [Healthy Appearance] : healthy appearance [No Acute Distress] : no acute distress [Well Developed] : well developed [No Proptosis] : no proptosis [Normal Oropharynx] : the oropharynx was normal [No Respiratory Distress] : no respiratory distress [No Accessory Muscle Use] : no accessory muscle use [Normal Rate and Effort] : normal respiratory rate and effort [Clear to Auscultation] : lungs were clear to auscultation bilaterally [Normal S1, S2] : normal S1 and S2 [Normal Rate] : heart rate was normal [Regular Rhythm] : with a regular rhythm [No Edema] : no peripheral edema [Normal Bowel Sounds] : normal bowel sounds [Not Tender] : non-tender [Not Distended] : not distended [Soft] : abdomen soft [Oriented x3] : oriented to person, place, and time [Normal Affect] : the affect was normal [Normal Mood] : the mood was normal [de-identified] : s/p top surgery with healed incisions

## 2021-10-13 LAB
ALBUMIN SERPL ELPH-MCNC: 5.3 G/DL
ALP BLD-CCNC: 64 U/L
ALT SERPL-CCNC: 14 U/L
ANION GAP SERPL CALC-SCNC: 16 MMOL/L
AST SERPL-CCNC: 18 U/L
BASOPHILS # BLD AUTO: 0.03 K/UL
BASOPHILS NFR BLD AUTO: 0.5 %
BILIRUB SERPL-MCNC: 0.5 MG/DL
BUN SERPL-MCNC: 14 MG/DL
CALCIUM SERPL-MCNC: 9.9 MG/DL
CHLORIDE SERPL-SCNC: 103 MMOL/L
CO2 SERPL-SCNC: 25 MMOL/L
CREAT SERPL-MCNC: 0.85 MG/DL
EOSINOPHIL # BLD AUTO: 0.09 K/UL
EOSINOPHIL NFR BLD AUTO: 1.5 %
GLUCOSE SERPL-MCNC: 109 MG/DL
HCT VFR BLD CALC: 47.1 %
HGB BLD-MCNC: 14.9 G/DL
IMM GRANULOCYTES NFR BLD AUTO: 0.2 %
LYMPHOCYTES # BLD AUTO: 2.23 K/UL
LYMPHOCYTES NFR BLD AUTO: 36.7 %
MAN DIFF?: NORMAL
MCHC RBC-ENTMCNC: 28.3 PG
MCHC RBC-ENTMCNC: 31.6 GM/DL
MCV RBC AUTO: 89.4 FL
MONOCYTES # BLD AUTO: 0.52 K/UL
MONOCYTES NFR BLD AUTO: 8.6 %
NEUTROPHILS # BLD AUTO: 3.19 K/UL
NEUTROPHILS NFR BLD AUTO: 52.5 %
PLATELET # BLD AUTO: 229 K/UL
POTASSIUM SERPL-SCNC: 4.1 MMOL/L
PROT SERPL-MCNC: 7.7 G/DL
RBC # BLD: 5.27 M/UL
RBC # FLD: 12.8 %
SODIUM SERPL-SCNC: 144 MMOL/L
TESTOST SERPL-MCNC: 752 NG/DL
WBC # FLD AUTO: 6.07 K/UL

## 2021-10-22 ENCOUNTER — MED ADMIN CHARGE (OUTPATIENT)
Age: 21
End: 2021-10-22

## 2021-10-22 NOTE — HISTORY OF PRESENT ILLNESS
[FreeTextEntry1] : PEPE HOWARD is a 20 year old, transmale seen on 09/20/2021 for immunization today.  \par

## 2021-10-25 ENCOUNTER — TRANSCRIPTION ENCOUNTER (OUTPATIENT)
Age: 21
End: 2021-10-25

## 2021-11-30 ENCOUNTER — TRANSCRIPTION ENCOUNTER (OUTPATIENT)
Age: 21
End: 2021-11-30

## 2022-01-01 NOTE — ASU PREOP CHECKLIST - SKIN PREP
PRINCIPAL DISCHARGE DIAGNOSIS  Diagnosis: Bronchiolitis  Assessment and Plan of Treatment:        n/a PRINCIPAL DISCHARGE DIAGNOSIS  Diagnosis: Bronchiolitis  Assessment and Plan of Treatment: Follow up with your pediatrician in 1-2 days to make sure that your child is doing better.    Return to the Emergency Department if:  -Your child is having more trouble breathing or appears to be breathing much faster than normal.  -Your child's skin appears blue.  -Your child needs stimulation to breathe regularly.  -Your child begins to improve, but suddenly develops worsening symptoms.  -Your child’s breathing is not regular or you notice pauses in breathing (apnea). This is most likely to occur in young infants.   -Your child is having difficulty drinking and is urinating much less.  -Your child is getting significantly dehydrated.  -Your child who is younger than 3 months has a temperature of 100°F (38°C) or higher.

## 2022-01-03 ENCOUNTER — TRANSCRIPTION ENCOUNTER (OUTPATIENT)
Age: 22
End: 2022-01-03

## 2022-02-01 ENCOUNTER — APPOINTMENT (OUTPATIENT)
Dept: ENDOCRINOLOGY | Facility: CLINIC | Age: 22
End: 2022-02-01
Payer: COMMERCIAL

## 2022-02-01 VITALS
OXYGEN SATURATION: 99 % | BODY MASS INDEX: 18.99 KG/M2 | HEIGHT: 67 IN | TEMPERATURE: 98.4 F | HEART RATE: 90 BPM | WEIGHT: 121 LBS | DIASTOLIC BLOOD PRESSURE: 76 MMHG | SYSTOLIC BLOOD PRESSURE: 161 MMHG

## 2022-02-01 PROCEDURE — 99214 OFFICE O/P EST MOD 30 MIN: CPT

## 2022-02-01 RX ORDER — SERTRALINE HYDROCHLORIDE 50 MG/1
50 TABLET, FILM COATED ORAL
Qty: 90 | Refills: 0 | Status: ACTIVE | COMMUNITY
Start: 2021-12-29

## 2022-02-01 NOTE — HISTORY OF PRESENT ILLNESS
[FreeTextEntry1] : PAVITHRA HOWARD is a 21 year old, transmale here for transgender hormone management. \par \par Pronouns: He/ Him / His \par Sexual orientation: mostly straight\par Gender identity: Male\par Name and Gender Marker Change: Done\par \par Transition history (Social, Endo, Surgical): When the patient was little, he felt he was a boy. Hated dolls and girls stuff, wanted a penis when he was [age] years old. Interested in doing all masculine things. Identical twin sister (Twin A) is supportive. Always liked and done different things. Sister supportive of the patient's transition. When he was in the women's restroom since starting college realized that he didn't want to be a women any longer. Told older sister months before coming out that he was a delmi. Spoke with parents about coming out first. When he told his mom (early Dec 2017), she initially shut down the conversation and her dad didn't understand (Nov 2017), but they let her come out publically and have "come around' and are supportive. Since then, they are supportive, and call the patient by "Pavithra" but are still mis-gendering the patient with pronouns. Came out publically in early Dec 2017.\par \par Started on testosterone 6/18/18 100 mg every 2 weeks. Now on testosterone 60 mg every 10 days. Last injection 1 day ago. Injects  SQ in abdomen. No issues with injections, but interested in switching to transdermal. Since starting testosterone, has noticed voice changes (deeper voice), bottom growth, some increased hair growth but not as much as he would like, clitoromegaly. Has increased libido. Has also had worsening acne, especially over the shoulders and upper arms. Has been using clindamycin cream. He states that he would like more hair growth and a deeper voice. He has had dysphoria about chest, and has now undergone top surgery 12/2018 periareolar with Dr. Pena and is satisfied with the results. Has seen gyn now s/p hysterectomy with Dr. Santoyo. Was not interested in egg freezing. At this time, he does not have headaches, blurry vision, chest pain, palpitations, abdominal pain, nausea, vomiting, diarrhea, constipation, leg/calf pain, lower extremity edema. \par Seen by Dr. Escobar 4/2021 for vaginal bleeding s/p intercourse recommended estrace cream. \par \par Education: Pt attended [employer], studying music/performance. High school: [employer]. 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 Edwin Bran ([facility] ) Pt has seen provider since June 2017, sessions are monthly. Happy to write letter. - Psychologist: Dr. Sri Quintero ( [facility] ) Pt has seen provider since May 2017, sessions are weekly. Provider is more on board with pt's transition than psychiatrist, according to pt's mother. \par

## 2022-02-01 NOTE — ASSESSMENT
[FreeTextEntry1] : 22 y/o trans M with gender dysphoria here for hormone male transition- Discussed the treatment regimen of testosterone and risks and benefits. He is aware of risk of polycythemia, CVA, CAD, liver effects, and worsening underlying psychiatric disease possibly with testosterone use. Also discussed benefits such as facial and body hair growth, body fat redistribution, lack of periods, clitorimegaly, deeper voice etc. He is aware the effects can take anywhere from 3-6 months for onset and 2 years for maximum effect. He is satisfied thus far with the effects of testosterone therapy. Now on testosterone 60 mg IM every 10 days. Levels 10/2021 a little too high dose decreased at that time to 0.3ml every 10 days. Repeat today and adjust testosterone as needed for goal peak testosterone of 700. He will consider testosterone pellets in the future. Now s/p top surgery with Dr. Pena and [facility] with Dr. Santoyo. Will need continued monitoring for cancer screening in the future such as mammography, colonoscopy. No plans for pregnancy. He did not want to entertain egg freezing or fertility options. Mental health follow-up recommended. \par \par Prep time with review of labs and interval progress notes and consultations 5 min\par Discussion with patient regarding hormone regimen with management plan, risks and benefits, treatment options and goals of care answering all patients questions and addressing all concerns 20 min\par Post-visit completion charting and review 5 min\par Total Time 30 min\par \par Specifically causes, evaluation, treatment options, risks, complications, and benefits of available therapies were discussed. Questions were answered.\par \par The submitted E/M billing level for this visit reflects the total time spent on the day of the visit including face-to-face time spent with the patient, non-face-to-face review of medical records and relevant information, documentation, and asynchronous communication with the patient after a visit via phone, email, or patient’s EHR portal after the visit. \par The medical records reviewed are either scanned into the chart or reviewed with the patient using a patient’s electronic medical records portal for patients with records not available to [facility] via electronic transmission platforms from other institutions and labs. \par Time spend counseling and performing coordination of care was also included in determining the appropriate EM billing level.\par \par I have reviewed and verified information regarding the chief complaint and history recorded by the ancillary staff and/or the patient. I have independently reviewed and interpreted tests performed by other physicians and facilities as necessary. \par \par I have discussed with the patient differential diagnosis, reason for auxiliary tests if ordered, risks, benefits, alternatives, and complications of each form of therapy were discussed.

## 2022-02-01 NOTE — DATA REVIEWED
[FreeTextEntry1] : Labs 10/2021:\par CBC normal\par Testosterone 752\par CMP normal except glucose of 109\par \par Labs 7/2021:\par Hb 15.7\par CMP normal\par Testosterone 758\par SHBG 26.3\par \par Labs 1/2021:\par Testosterone 665\par SHBG 29.5\par CMP normal\par Hb 15.1\par \par Labs 7/14/2020:\par Testosterone 634\par Estradiol 25\par Lipid Profile normal\par CMP normal\par CBC normal\par \par \par Labs 5/6/19:\par Estradiol 81\par LDL 78\par HDL 44\par Chol 136\par Trig 70\par CMP normal\par Testosterone 237\par \par Labs 1/2019:\par Testosterone 340.8\par Estradiol 40.93\par CMP normal\par CBC normal\par \par Labs 11/2018:\par Estradiol 54.83\par Testosterone 450\par CBC normal \par CMP normal\par \par Labs 7/24/18:\par Hb 13.1\par CMP normal\par Estradiol 39\par Testosterone 476.5\par \par Labs 3/2018:\par CBC normal\par LDL 82\par HDL 54\par Chol 145\par Trig 46\par CMP normal\par TSH 2.29\par Testosterone 35.2\par Estradiol 33\par Vit D 29.7\par DHEAS 204\par HIV nonreactive

## 2022-02-01 NOTE — PHYSICAL EXAM
[Alert] : alert [Well Nourished] : well nourished [Healthy Appearance] : healthy appearance [No Acute Distress] : no acute distress [Well Developed] : well developed [No Proptosis] : no proptosis [Normal Oropharynx] : the oropharynx was normal [No Respiratory Distress] : no respiratory distress [No Accessory Muscle Use] : no accessory muscle use [Normal Rate and Effort] : normal respiratory rate and effort [Clear to Auscultation] : lungs were clear to auscultation bilaterally [Normal S1, S2] : normal S1 and S2 [Normal Rate] : heart rate was normal [Regular Rhythm] : with a regular rhythm [No Edema] : no peripheral edema [Normal Bowel Sounds] : normal bowel sounds [Not Tender] : non-tender [Not Distended] : not distended [Soft] : abdomen soft [Oriented x3] : oriented to person, place, and time [Normal Affect] : the affect was normal [Normal Mood] : the mood was normal [de-identified] : s/p top surgery with healed incisions

## 2022-02-04 LAB
ALBUMIN SERPL ELPH-MCNC: 5.4 G/DL
ALP BLD-CCNC: 66 U/L
ALT SERPL-CCNC: 19 U/L
ANION GAP SERPL CALC-SCNC: 12 MMOL/L
AST SERPL-CCNC: 24 U/L
BASOPHILS # BLD AUTO: 0.03 K/UL
BASOPHILS NFR BLD AUTO: 0.5 %
BILIRUB SERPL-MCNC: 0.3 MG/DL
BUN SERPL-MCNC: 16 MG/DL
CALCIUM SERPL-MCNC: 9.6 MG/DL
CHLORIDE SERPL-SCNC: 100 MMOL/L
CO2 SERPL-SCNC: 30 MMOL/L
CREAT SERPL-MCNC: 0.9 MG/DL
EOSINOPHIL # BLD AUTO: 0.08 K/UL
EOSINOPHIL NFR BLD AUTO: 1.3 %
GLUCOSE SERPL-MCNC: 111 MG/DL
HCT VFR BLD CALC: 46.1 %
HGB BLD-MCNC: 15.3 G/DL
IMM GRANULOCYTES NFR BLD AUTO: 0.2 %
LYMPHOCYTES # BLD AUTO: 1.97 K/UL
LYMPHOCYTES NFR BLD AUTO: 32.5 %
MAN DIFF?: NORMAL
MCHC RBC-ENTMCNC: 29.4 PG
MCHC RBC-ENTMCNC: 33.2 GM/DL
MCV RBC AUTO: 88.5 FL
MONOCYTES # BLD AUTO: 0.47 K/UL
MONOCYTES NFR BLD AUTO: 7.8 %
NEUTROPHILS # BLD AUTO: 3.5 K/UL
NEUTROPHILS NFR BLD AUTO: 57.7 %
PLATELET # BLD AUTO: 219 K/UL
POTASSIUM SERPL-SCNC: 4.2 MMOL/L
PROT SERPL-MCNC: 7.6 G/DL
RBC # BLD: 5.21 M/UL
RBC # FLD: 12.2 %
SHBG SERPL-SCNC: 35 NMOL/L
SODIUM SERPL-SCNC: 141 MMOL/L
TESTOST SERPL-MCNC: 731 NG/DL
WBC # FLD AUTO: 6.06 K/UL

## 2022-02-26 ENCOUNTER — TRANSCRIPTION ENCOUNTER (OUTPATIENT)
Age: 22
End: 2022-02-26

## 2022-03-02 ENCOUNTER — TRANSCRIPTION ENCOUNTER (OUTPATIENT)
Age: 22
End: 2022-03-02

## 2022-03-14 ENCOUNTER — TRANSCRIPTION ENCOUNTER (OUTPATIENT)
Age: 22
End: 2022-03-14

## 2022-04-07 ENCOUNTER — RX RENEWAL (OUTPATIENT)
Age: 22
End: 2022-04-07

## 2022-04-07 ENCOUNTER — NON-APPOINTMENT (OUTPATIENT)
Age: 22
End: 2022-04-07

## 2022-04-27 ENCOUNTER — APPOINTMENT (OUTPATIENT)
Dept: ENDOCRINOLOGY | Facility: CLINIC | Age: 22
End: 2022-04-27
Payer: COMMERCIAL

## 2022-04-27 VITALS
BODY MASS INDEX: 18.99 KG/M2 | WEIGHT: 121 LBS | HEART RATE: 85 BPM | DIASTOLIC BLOOD PRESSURE: 73 MMHG | SYSTOLIC BLOOD PRESSURE: 152 MMHG | TEMPERATURE: 98.2 F | HEIGHT: 67 IN | OXYGEN SATURATION: 98 %

## 2022-04-27 PROCEDURE — 99214 OFFICE O/P EST MOD 30 MIN: CPT

## 2022-04-27 NOTE — ASSESSMENT
[FreeTextEntry1] : 22 y/o trans M with gender dysphoria here for hormone male transition- Discussed the treatment regimen of testosterone and risks and benefits. He is aware of risk of polycythemia, CVA, CAD, liver effects, and worsening underlying psychiatric disease possibly with testosterone use. Also discussed benefits such as facial and body hair growth, body fat redistribution, lack of periods, clitorimegaly, deeper voice etc. He is aware the effects can take anywhere from 3-6 months for onset and 2 years for maximum effect. He is satisfied thus far with the effects of testosterone therapy. Now on testosterone 60 mg IM every 10 days. Repeat today and adjust testosterone as needed for goal peak testosterone of 700. He will consider testosterone pellets in the future. Now s/p top surgery with Dr. Pena and [facility] with Dr. Santoyo. Will need continued monitoring for cancer screening in the future such as mammography, colonoscopy. No plans for pregnancy. He did not want to entertain egg freezing or fertility options. Mental health follow-up recommended. \par \par Prep time with review of labs and interval progress notes and consultations 5 min\par Discussion with patient regarding hormone regimen with management plan, risks and benefits, treatment options and goals of care answering all patients questions and addressing all concerns 20 min\par Post-visit completion charting and review 5 min\par Total Time 30 min\par \par Specifically causes, evaluation, treatment options, risks, complications, and benefits of available therapies were discussed. Questions were answered.\par \par The submitted E/M billing level for this visit reflects the total time spent on the day of the visit including face-to-face time spent with the patient, non-face-to-face review of medical records and relevant information, documentation, and asynchronous communication with the patient after a visit via phone, email, or patient’s EHR portal after the visit. \par The medical records reviewed are either scanned into the chart or reviewed with the patient using a patient’s electronic medical records portal for patients with records not available to [facility] via electronic transmission platforms from other institutions and labs. \par Time spend counseling and performing coordination of care was also included in determining the appropriate EM billing level.\par \par I have reviewed and verified information regarding the chief complaint and history recorded by the ancillary staff and/or the patient. I have independently reviewed and interpreted tests performed by other physicians and facilities as necessary. \par \par I have discussed with the patient differential diagnosis, reason for auxiliary tests if ordered, risks, benefits, alternatives, and complications of each form of therapy were discussed.

## 2022-04-27 NOTE — PHYSICAL EXAM
[Alert] : alert [Well Nourished] : well nourished [Healthy Appearance] : healthy appearance [No Acute Distress] : no acute distress [Well Developed] : well developed [No Proptosis] : no proptosis [Normal Oropharynx] : the oropharynx was normal [No Respiratory Distress] : no respiratory distress [No Accessory Muscle Use] : no accessory muscle use [Normal Rate and Effort] : normal respiratory rate and effort [Clear to Auscultation] : lungs were clear to auscultation bilaterally [Normal S1, S2] : normal S1 and S2 [Normal Rate] : heart rate was normal [Regular Rhythm] : with a regular rhythm [No Edema] : no peripheral edema [Normal Bowel Sounds] : normal bowel sounds [Not Tender] : non-tender [Not Distended] : not distended [Soft] : abdomen soft [Oriented x3] : oriented to person, place, and time [Normal Affect] : the affect was normal [Normal Mood] : the mood was normal [de-identified] : s/p top surgery with healed incisions

## 2022-04-27 NOTE — HISTORY OF PRESENT ILLNESS
[FreeTextEntry1] : PAVITHRA HOWARD is a 21 year old, transmale here for transgender hormone management. \par \par Pronouns: He/ Him / His \par Sexual orientation: mostly straight\par Gender identity: Male\par Name and Gender Marker Change: Done\par \par Transition history (Social, Endo, Surgical): When the patient was little, he felt he was a boy. Hated dolls and girls stuff, wanted a penis when he was [age] years old. Interested in doing all masculine things. Identical twin sister (Twin A) is supportive. Always liked and done different things. Sister supportive of the patient's transition. When he was in the women's restroom since starting college realized that he didn't want to be a women any longer. Told older sister months before coming out that he was a delmi. Spoke with parents about coming out first. When he told his mom (early Dec 2017), she initially shut down the conversation and her dad didn't understand (Nov 2017), but they let her come out publically and have "come around' and are supportive. Since then, they are supportive, and call the patient by "Pavithra" but are still mis-gendering the patient with pronouns. Came out publically in early Dec 2017.\par \par Started on testosterone 6/18/18 100 mg every 2 weeks. Now on testosterone 6 mg every 10 days. Last injection 6 days ago. Injects into buttocks. No issues with injections. Since starting testosterone, has noticed voice changes (deeper voice), bottom growth, some increased hair growth but not as much as he would like, clitoromegaly. Has increased libido. Has also had worsening acne, especially over the shoulders and upper arms. Has been using clindamycin cream. He states that he would like more hair growth and a deeper voice. He has had dysphoria about chest, and has now undergone top surgery 12/2018 periareolar with Dr. Pena and is satisfied with the results. Has seen gyn now s/p hysterectomy with Dr. Santoyo. Was not interested in egg freezing. At this time, he does not have headaches, blurry vision, chest pain, palpitations, abdominal pain, nausea, vomiting, diarrhea, constipation, leg/calf pain, lower extremity edema. \par Seen by Dr. Escobar 4/2021 for vaginal bleeding s/p intercourse recommended estrace cream. \par \par Education: Pt attended [employer], studying music/performance. High school: [employer]. 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 Edwin Bran ([facility] ) Pt has seen provider since June 2017, sessions are monthly. Happy to write letter. - Psychologist: Dr. Sri Quintero ( [facility] ) Pt has seen provider since May 2017, sessions are weekly. Provider is more on board with pt's transition than psychiatrist, according to pt's mother. \par

## 2022-04-29 LAB
ALBUMIN SERPL ELPH-MCNC: 5.4 G/DL
ALP BLD-CCNC: 56 U/L
ALT SERPL-CCNC: 22 U/L
ANION GAP SERPL CALC-SCNC: 15 MMOL/L
AST SERPL-CCNC: 28 U/L
BASOPHILS # BLD AUTO: 0.04 K/UL
BASOPHILS NFR BLD AUTO: 1 %
BILIRUB SERPL-MCNC: 0.7 MG/DL
BUN SERPL-MCNC: 14 MG/DL
CALCIUM SERPL-MCNC: 10.1 MG/DL
CHLORIDE SERPL-SCNC: 100 MMOL/L
CO2 SERPL-SCNC: 28 MMOL/L
CREAT SERPL-MCNC: 0.91 MG/DL
EGFR: 121 ML/MIN/1.73M2
EOSINOPHIL # BLD AUTO: 0.04 K/UL
EOSINOPHIL NFR BLD AUTO: 1 %
GLUCOSE SERPL-MCNC: 92 MG/DL
HCT VFR BLD CALC: 48.9 %
HGB BLD-MCNC: 15.6 G/DL
IMM GRANULOCYTES NFR BLD AUTO: 0.2 %
LYMPHOCYTES # BLD AUTO: 1.53 K/UL
LYMPHOCYTES NFR BLD AUTO: 38 %
MAN DIFF?: NORMAL
MCHC RBC-ENTMCNC: 29 PG
MCHC RBC-ENTMCNC: 31.9 GM/DL
MCV RBC AUTO: 90.9 FL
MONOCYTES # BLD AUTO: 0.37 K/UL
MONOCYTES NFR BLD AUTO: 9.2 %
NEUTROPHILS # BLD AUTO: 2.04 K/UL
NEUTROPHILS NFR BLD AUTO: 50.6 %
PLATELET # BLD AUTO: 201 K/UL
POTASSIUM SERPL-SCNC: 4.8 MMOL/L
PROT SERPL-MCNC: 7.7 G/DL
RBC # BLD: 5.38 M/UL
RBC # FLD: 12.5 %
SODIUM SERPL-SCNC: 143 MMOL/L
TESTOST SERPL-MCNC: 714 NG/DL
WBC # FLD AUTO: 4.03 K/UL

## 2022-05-10 ENCOUNTER — TRANSCRIPTION ENCOUNTER (OUTPATIENT)
Age: 22
End: 2022-05-10

## 2022-07-05 ENCOUNTER — TRANSCRIPTION ENCOUNTER (OUTPATIENT)
Age: 22
End: 2022-07-05

## 2022-07-14 ENCOUNTER — APPOINTMENT (OUTPATIENT)
Dept: FAMILY MEDICINE | Facility: CLINIC | Age: 22
End: 2022-07-14

## 2022-07-14 VITALS
HEART RATE: 83 BPM | BODY MASS INDEX: 18.99 KG/M2 | DIASTOLIC BLOOD PRESSURE: 81 MMHG | OXYGEN SATURATION: 98 % | WEIGHT: 121 LBS | SYSTOLIC BLOOD PRESSURE: 128 MMHG | TEMPERATURE: 97.3 F | HEIGHT: 67 IN

## 2022-07-14 PROCEDURE — 99203 OFFICE O/P NEW LOW 30 MIN: CPT

## 2022-07-18 NOTE — PRE-ANESTHESIA EVALUATION ADULT - WEIGHT IN KG
Service to Ortho/Hand referral    7/18/2022  vat  Left distal radius fracture    DOI:  07/16/2022  Images:  Done and in epic.      Other:  Splinted         Contacted patient.  Offer appoint with Dr. Peterson tomorrow [place].  Patient that too far for travel and refused.  Made appointment with Huan More MD  using 9:45 a.m. slot on Wednesday July 20th , [address].               Noble Bunch PA-C  [facility]  [facility]  Hand   [address]  [address]  [place] [address]  Main Orthopedic Line: [phone]         Supervising Physician for this date and note.   Dr Jerzy Nicholas       
57.6

## 2022-09-29 ENCOUNTER — NON-APPOINTMENT (OUTPATIENT)
Age: 22
End: 2022-09-29

## 2022-10-10 ENCOUNTER — APPOINTMENT (OUTPATIENT)
Dept: OBGYN | Facility: CLINIC | Age: 22
End: 2022-10-10

## 2022-10-10 VITALS — WEIGHT: 120 LBS | SYSTOLIC BLOOD PRESSURE: 140 MMHG | DIASTOLIC BLOOD PRESSURE: 71 MMHG

## 2022-10-10 DIAGNOSIS — N76.0 ACUTE VAGINITIS: ICD-10-CM

## 2022-10-10 PROCEDURE — 99213 OFFICE O/P EST LOW 20 MIN: CPT

## 2022-10-10 RX ORDER — ESTRADIOL 0.1 MG/G
0.1 CREAM VAGINAL
Qty: 1 | Refills: 3 | Status: ACTIVE | COMMUNITY
Start: 2022-10-10 | End: 1900-01-01

## 2022-10-11 LAB
CANDIDA VAG CYTO: NOT DETECTED
G VAGINALIS+PREV SP MTYP VAG QL MICRO: NOT DETECTED
T VAGINALIS VAG QL WET PREP: NOT DETECTED

## 2022-10-12 ENCOUNTER — NON-APPOINTMENT (OUTPATIENT)
Age: 22
End: 2022-10-12

## 2022-10-24 ENCOUNTER — APPOINTMENT (OUTPATIENT)
Dept: ENDOCRINOLOGY | Facility: CLINIC | Age: 22
End: 2022-10-24

## 2022-10-24 VITALS
HEART RATE: 86 BPM | SYSTOLIC BLOOD PRESSURE: 121 MMHG | BODY MASS INDEX: 19.62 KG/M2 | TEMPERATURE: 98.3 F | RESPIRATION RATE: 18 BRPM | HEIGHT: 67 IN | OXYGEN SATURATION: 98 % | DIASTOLIC BLOOD PRESSURE: 76 MMHG | WEIGHT: 125 LBS

## 2022-10-24 PROCEDURE — 99214 OFFICE O/P EST MOD 30 MIN: CPT

## 2022-10-24 RX ORDER — SERTRALINE 25 MG/1
25 TABLET, FILM COATED ORAL
Qty: 90 | Refills: 0 | Status: ACTIVE | COMMUNITY
Start: 2022-10-17

## 2022-10-24 NOTE — HISTORY OF PRESENT ILLNESS
[FreeTextEntry1] : PAVITHRA HOWARD is a 21 year old, transmale here for transgender hormone management. \par \par Pronouns: He/ Him / His \par Sexual orientation: mostly straight\par Gender identity: Male\par Name and Gender Marker Change: Done\par \par Transition history (Social, Endo, Surgical): When the patient was little, he felt he was a boy. Hated dolls and girls stuff, wanted a penis when he was [age] years old. Interested in doing all masculine things. Identical twin sister (Twin A) is supportive. Always liked and done different things. Sister supportive of the patient's transition. When he was in the women's restroom since starting college realized that he didn't want to be a women any longer. Told older sister months before coming out that he was a delmi. Spoke with parents about coming out first. When he told his mom (early Dec 2017), she initially shut down the conversation and her dad didn't understand (Nov 2017), but they let her come out publically and have "come around' and are supportive. Since then, they are supportive, and call the patient by "Pavithra" but are still mis-gendering the patient with pronouns. Came out publically in early Dec 2017.\par \par Started on testosterone 6/18/18 100 mg every 2 weeks. Now on testosterone 50 mg every 10 days. Dose lowered from 0.3 (60mg) around 4/2022 for level of 714 at 6 days post-injection. Last injection 6 days ago. Injects into buttocks. No issues with injections. Since starting testosterone, has noticed voice changes (deeper voice), bottom growth, some increased hair growth but not as much as he would like, clitoromegaly. Has increased libido. Has also had worsening acne, especially over the shoulders and upper arms. Has been using clindamycin cream. He states that he would like more hair growth and a deeper voice. He has had dysphoria about chest, and has now undergone top surgery 12/2018 periareolar with Dr. Pena and is satisfied with the results. Has seen gyn now s/p hysterectomy with Dr. Santoyo. Was not interested in egg freezing. At this time, he does not have headaches, blurry vision, chest pain, palpitations, abdominal pain, nausea, vomiting, diarrhea, constipation, leg/calf pain, lower extremity edema. \par Seen by Dr. Escobar 4/2021 and 10/2022 for vaginal bleeding s/p intercourse recommended estrace cream. \par Zoloft increased to 75 mg daily on 10/18/2022 with improved depression and anxiety. \par \par Education: Pt attended [employer], studying music/performance. High school: [employer]. 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 Edwin Bran ([facility] ) Pt has seen provider since June 2017, sessions are monthly. Happy to write letter. - Psychologist: Dr. Sri Quintero ( [facility] ) Pt has seen provider since May 2017, sessions are weekly. Provider is more on board with pt's transition than psychiatrist, according to pt's mother. \par

## 2022-10-24 NOTE — DATA REVIEWED
[FreeTextEntry1] : Labs 4/2022:\par Testosterone 714\par CMP normal\par CBC normal\par \par Labs 7/2021:\par Hb 15.7\par CMP normal\par Testosterone 758\par SHBG 26.3\par \par Labs 1/2021:\par Testosterone 665\par SHBG 29.5\par CMP normal\par Hb 15.1\par \par Labs 7/14/2020:\par Testosterone 634\par Estradiol 25\par Lipid Profile normal\par CMP normal\par CBC normal\par \par \par Labs 5/6/19:\par Estradiol 81\par LDL 78\par HDL 44\par Chol 136\par Trig 70\par CMP normal\par Testosterone 237\par \par Labs 1/2019:\par Testosterone 340.8\par Estradiol 40.93\par CMP normal\par CBC normal\par \par Labs 11/2018:\par Estradiol 54.83\par Testosterone 450\par CBC normal \par CMP normal\par \par Labs 7/24/18:\par Hb 13.1\par CMP normal\par Estradiol 39\par Testosterone 476.5\par \par Labs 3/2018:\par CBC normal\par LDL 82\par HDL 54\par Chol 145\par Trig 46\par CMP normal\par TSH 2.29\par Testosterone 35.2\par Estradiol 33\par Vit D 29.7\par DHEAS 204\par HIV nonreactive

## 2022-10-24 NOTE — PHYSICAL EXAM
[Alert] : alert [Well Nourished] : well nourished [Healthy Appearance] : healthy appearance [No Acute Distress] : no acute distress [Well Developed] : well developed [No Proptosis] : no proptosis [Normal Oropharynx] : the oropharynx was normal [No Respiratory Distress] : no respiratory distress [No Accessory Muscle Use] : no accessory muscle use [Normal Rate and Effort] : normal respiratory rate and effort [Clear to Auscultation] : lungs were clear to auscultation bilaterally [Normal S1, S2] : normal S1 and S2 [Normal Rate] : heart rate was normal [Regular Rhythm] : with a regular rhythm [No Edema] : no peripheral edema [Normal Bowel Sounds] : normal bowel sounds [Not Tender] : non-tender [Not Distended] : not distended [Soft] : abdomen soft [Oriented x3] : oriented to person, place, and time [Normal Affect] : the affect was normal [Normal Mood] : the mood was normal [de-identified] : s/p top surgery with healed incisions

## 2022-10-24 NOTE — ASSESSMENT
[FreeTextEntry1] : 20 y/o trans M with gender dysphoria here for hormone male transition- Discussed the treatment regimen of testosterone and risks and benefits. He is aware of risk of polycythemia, CVA, CAD, liver effects, and worsening underlying psychiatric disease possibly with testosterone use. Also discussed benefits such as facial and body hair growth, body fat redistribution, lack of periods, clitorimegaly, deeper voice etc. He is aware the effects can take anywhere from 3-6 months for onset and 2 years for maximum effect. He is satisfied thus far with the effects of testosterone therapy. Now on testosterone 50 mg IM every 10 days. Repeat today and adjust testosterone as needed for goal peak testosterone of 700. He will consider testosterone pellets in the future. Now s/p top surgery with Dr. Pena and [facility] with Dr. Santoyo. Will need continued monitoring for cancer screening in the future such as mammography, colonoscopy. No plans for pregnancy. He did not want to entertain egg freezing or fertility options. Mental health follow-up recommended. \par \par Prep time with review of labs and interval progress notes and consultations 5 min\par Discussion with patient regarding hormone regimen with management plan, risks and benefits, treatment options and goals of care answering all patients questions and addressing all concerns 20 min\par Post-visit completion charting and review 5 min\par Total Time 30 min\par \par Specifically causes, evaluation, treatment options, risks, complications, and benefits of available therapies were discussed. Questions were answered.\par \par The submitted E/M billing level for this visit reflects the total time spent on the day of the visit including face-to-face time spent with the patient, non-face-to-face review of medical records and relevant information, documentation, and asynchronous communication with the patient after a visit via phone, email, or patient’s EHR portal after the visit. \par The medical records reviewed are either scanned into the chart or reviewed with the patient using a patient’s electronic medical records portal for patients with records not available to [facility] via electronic transmission platforms from other institutions and labs. \par Time spend counseling and performing coordination of care was also included in determining the appropriate EM billing level.\par \par I have reviewed and verified information regarding the chief complaint and history recorded by the ancillary staff and/or the patient. I have independently reviewed and interpreted tests performed by other physicians and facilities as necessary. \par \par I have discussed with the patient differential diagnosis, reason for auxiliary tests if ordered, risks, benefits, alternatives, and complications of each form of therapy were discussed.

## 2022-10-25 LAB
ALBUMIN SERPL ELPH-MCNC: 4.9 G/DL
ALP BLD-CCNC: 58 U/L
ALT SERPL-CCNC: 23 U/L
ANION GAP SERPL CALC-SCNC: 15 MMOL/L
AST SERPL-CCNC: 28 U/L
BASOPHILS # BLD AUTO: 0.04 K/UL
BASOPHILS NFR BLD AUTO: 0.6 %
BILIRUB SERPL-MCNC: 0.5 MG/DL
BUN SERPL-MCNC: 13 MG/DL
CALCIUM SERPL-MCNC: 9.9 MG/DL
CHLORIDE SERPL-SCNC: 99 MMOL/L
CO2 SERPL-SCNC: 27 MMOL/L
CREAT SERPL-MCNC: 0.89 MG/DL
EGFR: 123 ML/MIN/1.73M2
EOSINOPHIL # BLD AUTO: 0.11 K/UL
EOSINOPHIL NFR BLD AUTO: 1.7 %
GLUCOSE SERPL-MCNC: 100 MG/DL
HCT VFR BLD CALC: 46 %
HGB BLD-MCNC: 15.3 G/DL
IMM GRANULOCYTES NFR BLD AUTO: 0.3 %
LYMPHOCYTES # BLD AUTO: 1.98 K/UL
LYMPHOCYTES NFR BLD AUTO: 31.3 %
MAN DIFF?: NORMAL
MCHC RBC-ENTMCNC: 29 PG
MCHC RBC-ENTMCNC: 33.3 GM/DL
MCV RBC AUTO: 87.3 FL
MONOCYTES # BLD AUTO: 0.63 K/UL
MONOCYTES NFR BLD AUTO: 10 %
NEUTROPHILS # BLD AUTO: 3.55 K/UL
NEUTROPHILS NFR BLD AUTO: 56.1 %
PLATELET # BLD AUTO: 235 K/UL
POTASSIUM SERPL-SCNC: 4.5 MMOL/L
PROT SERPL-MCNC: 7.2 G/DL
RBC # BLD: 5.27 M/UL
RBC # FLD: 12.1 %
SODIUM SERPL-SCNC: 141 MMOL/L
TESTOST SERPL-MCNC: 454 NG/DL
WBC # FLD AUTO: 6.33 K/UL

## 2022-11-17 ENCOUNTER — TRANSCRIPTION ENCOUNTER (OUTPATIENT)
Age: 22
End: 2022-11-17

## 2022-12-04 ENCOUNTER — EMERGENCY (EMERGENCY)
Facility: HOSPITAL | Age: 22
LOS: 0 days | Discharge: HOME | End: 2022-12-04
Attending: STUDENT IN AN ORGANIZED HEALTH CARE EDUCATION/TRAINING PROGRAM

## 2022-12-04 VITALS
OXYGEN SATURATION: 100 % | SYSTOLIC BLOOD PRESSURE: 110 MMHG | HEART RATE: 71 BPM | DIASTOLIC BLOOD PRESSURE: 70 MMHG | RESPIRATION RATE: 18 BRPM | TEMPERATURE: 98 F

## 2022-12-04 VITALS
WEIGHT: 128.09 LBS | HEIGHT: 67 IN | SYSTOLIC BLOOD PRESSURE: 115 MMHG | HEART RATE: 78 BPM | OXYGEN SATURATION: 99 % | DIASTOLIC BLOOD PRESSURE: 68 MMHG | RESPIRATION RATE: 20 BRPM | TEMPERATURE: 99 F
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
## 2022-12-04 DIAGNOSIS — S43.005A UNSPECIFIED DISLOCATION OF LEFT SHOULDER JOINT, INITIAL ENCOUNTER: ICD-10-CM

## 2022-12-04 DIAGNOSIS — S60.511A ABRASION OF RIGHT HAND, INITIAL ENCOUNTER: ICD-10-CM

## 2022-12-04 DIAGNOSIS — Z90.13 ACQUIRED ABSENCE OF BILATERAL BREASTS AND NIPPLES: Chronic | ICD-10-CM

## 2022-12-04 DIAGNOSIS — K08.409 PARTIAL LOSS OF TEETH, UNSPECIFIED CAUSE, UNSPECIFIED CLASS: Chronic | ICD-10-CM
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
## 2022-12-04 DIAGNOSIS — M25.512 PAIN IN LEFT SHOULDER: ICD-10-CM

## 2022-12-04 DIAGNOSIS — Z91.018 ALLERGY TO OTHER FOODS: ICD-10-CM

## 2022-12-04 DIAGNOSIS — Z88.0 ALLERGY STATUS TO PENICILLIN: ICD-10-CM
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
## 2022-12-04 PROCEDURE — 73030 X-RAY EXAM OF SHOULDER: CPT | Mod: 26,LT

## 2022-12-04 PROCEDURE — 23650 CLTX SHO DSLC W/MNPJ WO ANES: CPT | Mod: 54

## 2022-12-04 PROCEDURE — 99284 EMERGENCY DEPT VISIT MOD MDM: CPT | Mod: 57

## 2022-12-04 RX ORDER — IBUPROFEN 200 MG
600 TABLET ORAL ONCE
Refills: 0 | Status: COMPLETED | OUTPATIENT
Start: 2022-12-04 | End: 2022-12-04

## 2022-12-04 RX ADMIN — Medication 600 MILLIGRAM(S): at 14:56

## 2022-12-04 NOTE — ED ADULT TRIAGE NOTE - CHIEF COMPLAINT QUOTE
Tripped and fell on uneven side walk c/o left shoulder pain and decrease ability to move, fingers remains warm and mobile. Denies hitting his head.

## 2022-12-04 NOTE — ED PROCEDURE NOTE - CPROC ED POST PROC CARE GUIDE1
Welcome to the [facility]...your Lifestyle Renovation begins now! Thank you for placing your trust in our health care team, I look forward to working with you along this journey to better health!     Next steps:     1.  Sched minimum. Meditation daily can help manage and control stress. Chronic stress can make weight loss difficult.   Exercising is one way to help with stress, but meditation using the CALM Fadi or another comparable alternative can be done in your home or plac Verbal/written post procedure instructions were given to patient/caregiver./Instructed patient/caregiver to follow-up with primary care physician.

## 2022-12-04 NOTE — ED PROVIDER NOTE - CARE PROVIDER_API CALL
Philipp Tavarez (DO)  Orthopaedic Surgery Surgery  [address], [address] [place] [address]  Phone: [phone]  Fax: [phone]  Follow Up Time: 7-10 Days

## 2022-12-04 NOTE — ED PROVIDER NOTE - PHYSICAL EXAMINATION
GEN: Awake, alert, interactive, NAD.  HEAD AND NECK: NC/AT. Airway patent. Neck supple.   EYES:  Clear b/l. EOMI. PERRL.   ENT: Moist mucus membranes.   CARDIAC: Regular rate, regular rhythm. No evident pedal edema.    RESP/CHEST: Normal respiratory effort with no use of accessory muscles or retractions. Clear throughout on auscultation.  ABD: Soft, non-distended, non-tender. No rebound, no guarding.   BACK: No midline spinal TTP. No CVAT.   EXTREMITIES: + LUE NVI, + squared off deformity L shoulder, decreased ROM. No TTP L elbow / L wrist.   SKIN: Warm, dry, intact normal color. No rash.   NEURO: AOx3, CN II-XII grossly intact, no focal deficits.   PSYCH: Appropriate mood and affect.

## 2022-12-04 NOTE — ED PROCEDURE NOTE - CPROC ED TOLERANCE1
Detail Level: Simple
Additional Notes: Patient consent was obtained to proceed with the visit and recommended plan of care after discussion of all risks and benefits, including the risks of COVID-19 exposure.
Patient tolerated procedure well.

## 2022-12-04 NOTE — ED PROVIDER NOTE - PROGRESS NOTE DETAILS
XR w/ + dislocation, L shoulder reduced, rpt XR w/ successful reduction. LUE NVI pre- and post-reduction. On re-eval, resting comfortably, in NAD. Stable for d/c home. Placed in LUE sling. Recommend Tylenol / Motrin PRN symptomatic. Given and instructed for close outpatient Ortho f/u. Return signs / symptoms d/w pt, mother. They understand / agree w/ this plan.

## 2022-12-04 NOTE — ED ADULT NURSE NOTE - CAS TRG GENERAL AIRWAY, MLM
Start keflex BID today through her procedure    Dr.Laven BELL-urs with laser and bx of bladder lesion on 12/8    Connie    Patent

## 2022-12-04 NOTE — ED PROVIDER NOTE - OBJECTIVE STATEMENT
21F>M transgender pw L shoulder pain, decreased ROM s/p trip and fall 1140A today. Pt states was running and tripped on uneven sidewalk, landing w/ full weight on L shoulder. No head strike, no LOC. + abrasions to R palm, BL knees. No meds PTA.     PMH none, Allergy PCN / Amox, Meds as listed, PSH hysterectomy, top surgery. 21F>M transgender pw L shoulder pain, decreased ROM s/p trip and fall 1140A today. Pt states was running and tripped on uneven sidewalk, landing w/ full weight on L shoulder. No head strike, no LOC. + abrasions to R palm, BL knees. No meds PTA. Pt is R hand dominant.     PMH none, Allergy PCN / Amox, Meds as listed, PSH hysterectomy, top surgery.

## 2022-12-04 NOTE — ED PROVIDER NOTE - CLINICAL SUMMARY MEDICAL DECISION MAKING FREE TEXT BOX
21F>M pw L shoulder pain, decreased ROM s/p trip and fall PTA. AF, VSS. Well appearing, in NAD. Exam as noted in PE. Plan: AUSTEN, Motrin. Re-eval.

## 2022-12-04 NOTE — ED ADULT NURSE NOTE - OBJECTIVE STATEMENT
Patient received with complaints of pain to L shoulder post fall while running, pt states he stumble

## 2022-12-07 ENCOUNTER — APPOINTMENT (OUTPATIENT)
Dept: ORTHOPEDIC SURGERY | Facility: CLINIC | Age: 22
End: 2022-12-07

## 2022-12-07 VITALS
TEMPERATURE: 98.7 F | WEIGHT: 128 LBS | HEART RATE: 94 BPM | HEIGHT: 67 IN | SYSTOLIC BLOOD PRESSURE: 134 MMHG | OXYGEN SATURATION: 99 % | BODY MASS INDEX: 20.09 KG/M2 | DIASTOLIC BLOOD PRESSURE: 78 MMHG

## 2022-12-07 DIAGNOSIS — M25.512 PAIN IN LEFT SHOULDER: ICD-10-CM

## 2022-12-07 DIAGNOSIS — S43.015A ANTERIOR DISLOCATION OF LEFT HUMERUS, INITIAL ENCOUNTER: ICD-10-CM

## 2022-12-07 DIAGNOSIS — S43.005A UNSPECIFIED DISLOCATION OF LEFT SHOULDER JOINT, INITIAL ENCOUNTER: ICD-10-CM

## 2022-12-07 PROCEDURE — 73030 X-RAY EXAM OF SHOULDER: CPT | Mod: LT

## 2022-12-07 PROCEDURE — 99203 OFFICE O/P NEW LOW 30 MIN: CPT

## 2022-12-15 ENCOUNTER — TRANSCRIPTION ENCOUNTER (OUTPATIENT)
Age: 22
End: 2022-12-15

## 2022-12-28 NOTE — ED BEHAVIORAL HEALTH ASSESSMENT NOTE - PRIVATE RESIDENCE
----- Message from Kuldeep Haynes sent at 12/28/2022  9:21 AM EST -----  Subject: Message to Provider    QUESTIONS  Information for Provider? Patient Daughter Sammi calling about getting a   letter to [employer] regarding not being able to pay rent right now due to   medical issues. They are still waiting on the information from [employer] to get his rent lowered. Please call patient Daughter with Question  ---------------------------------------------------------------------------  --------------  CALL BACK INFO  [phone]; OK to leave message on voicemail  ---------------------------------------------------------------------------  --------------  SCRIPT ANSWERS  Relationship to Patient? Other  Representative Name? Daughter  Is the Representative on the appropriate HIPAA document in Epic? Yes   [place]rett

## 2023-01-11 ENCOUNTER — APPOINTMENT (OUTPATIENT)
Dept: ORTHOPEDIC SURGERY | Facility: CLINIC | Age: 23
End: 2023-01-11

## 2023-01-13 ENCOUNTER — APPOINTMENT (OUTPATIENT)
Dept: ORTHOPEDIC SURGERY | Facility: CLINIC | Age: 23
End: 2023-01-13
Payer: COMMERCIAL

## 2023-01-13 VITALS
HEART RATE: 77 BPM | BODY MASS INDEX: 20.09 KG/M2 | DIASTOLIC BLOOD PRESSURE: 79 MMHG | SYSTOLIC BLOOD PRESSURE: 133 MMHG | WEIGHT: 128 LBS | TEMPERATURE: 97.6 F | HEIGHT: 67 IN | OXYGEN SATURATION: 99 %

## 2023-01-13 PROCEDURE — 99213 OFFICE O/P EST LOW 20 MIN: CPT

## 2023-01-23 ENCOUNTER — TRANSCRIPTION ENCOUNTER (OUTPATIENT)
Age: 23
End: 2023-01-23

## 2023-02-13 NOTE — PHYSICAL EXAM
[NI] : Normal [de-identified] : Bilateral incisions healing well, no evidence of infection. Mild wound dehiscence bilaterally.\par Nipples in good position. Minoxidil Counseling: Minoxidil is a topical medication which can increase blood flow where it is applied. It is uncertain how this medication increases hair growth. Side effects are uncommon and include stinging and allergic reactions.

## 2023-02-17 ENCOUNTER — TRANSCRIPTION ENCOUNTER (OUTPATIENT)
Age: 23
End: 2023-02-17

## 2023-03-13 ENCOUNTER — APPOINTMENT (OUTPATIENT)
Dept: OBGYN | Facility: CLINIC | Age: 23
End: 2023-03-13
Payer: COMMERCIAL

## 2023-03-13 VITALS
DIASTOLIC BLOOD PRESSURE: 80 MMHG | WEIGHT: 138 LBS | HEIGHT: 67 IN | SYSTOLIC BLOOD PRESSURE: 140 MMHG | BODY MASS INDEX: 21.66 KG/M2

## 2023-03-13 DIAGNOSIS — N95.2 POSTMENOPAUSAL ATROPHIC VAGINITIS: ICD-10-CM

## 2023-03-13 PROCEDURE — 99395 PREV VISIT EST AGE 18-39: CPT

## 2023-03-13 RX ORDER — ESTRADIOL 0.1 MG/G
0.1 CREAM VAGINAL
Qty: 1 | Refills: 6 | Status: ACTIVE | COMMUNITY
Start: 2023-03-13 | End: 1900-01-01

## 2023-03-13 NOTE — PHYSICAL EXAM
[Appropriately responsive] : appropriately responsive [Alert] : alert [No Acute Distress] : no acute distress [No Lymphadenopathy] : no lymphadenopathy [Regular Rate Rhythm] : regular rate rhythm [No Murmurs] : no murmurs [Clear to Auscultation B/L] : clear to auscultation bilaterally [Soft] : soft [Non-tender] : non-tender [Non-distended] : non-distended [No HSM] : No HSM [No Lesions] : no lesions [Oriented x3] : oriented x3 [No Mass] : no mass [Examination Of The Breasts] : a normal appearance [No Masses] : no breast masses were palpable [Labia Majora] : normal [Labia Minora] : normal [Enlarged] : enlarged [Normal] : normal [Uterine Adnexae] : normal [FreeTextEntry6] : s/p top surgery

## 2023-03-13 NOTE — HISTORY OF PRESENT ILLNESS
[FreeTextEntry1] : 21 yo G0 AFAB trans male on testosterone presents for annual\par \par AFAB partner - they/them pronouns,  8 months. moving in together this summer\par s/p top surgery\par \par GYNHx:\par denies abnl pap smears - s/p hyst\par denies STIs\par \par lives with: family \par works/education: [employer], [employer] for [employer]  , [employer] \par exercise:  running, hurt shoulder\par \par sexually active with: girlfriend\par denies dyspareunia. denies problems with orgasm\par denies dysuria\par \par gardasil: s/p \par COVID vaccine: s/p \par \par PCP = Kaylen\par \par \par

## 2023-03-14 LAB
HBV SURFACE AG SER QL: NONREACTIVE
HCV AB SER QL: NONREACTIVE
HCV S/CO RATIO: 0.07 S/CO
HIV1+2 AB SPEC QL IA.RAPID: NONREACTIVE
T PALLIDUM AB SER QL IA: NEGATIVE

## 2023-03-16 ENCOUNTER — APPOINTMENT (OUTPATIENT)
Dept: ORTHOPEDIC SURGERY | Facility: CLINIC | Age: 23
End: 2023-03-16
Payer: COMMERCIAL

## 2023-03-16 VITALS
SYSTOLIC BLOOD PRESSURE: 134 MMHG | HEART RATE: 76 BPM | OXYGEN SATURATION: 98 % | TEMPERATURE: 97.5 F | HEIGHT: 67 IN | BODY MASS INDEX: 21.19 KG/M2 | DIASTOLIC BLOOD PRESSURE: 73 MMHG | WEIGHT: 135 LBS

## 2023-03-16 DIAGNOSIS — M25.612 STIFFNESS OF LEFT SHOULDER, NOT ELSEWHERE CLASSIFIED: ICD-10-CM

## 2023-03-16 PROCEDURE — 99213 OFFICE O/P EST LOW 20 MIN: CPT

## 2023-04-05 ENCOUNTER — TRANSCRIPTION ENCOUNTER (OUTPATIENT)
Age: 23
End: 2023-04-05

## 2023-05-17 ENCOUNTER — TRANSCRIPTION ENCOUNTER (OUTPATIENT)
Age: 23
End: 2023-05-17

## 2023-05-23 LAB
ALBUMIN SERPL ELPH-MCNC: 4.9 G/DL
ALP BLD-CCNC: 58 U/L
ALT SERPL-CCNC: 22 U/L
ANION GAP SERPL CALC-SCNC: 14 MMOL/L
AST SERPL-CCNC: 26 U/L
BILIRUB SERPL-MCNC: 0.6 MG/DL
BUN SERPL-MCNC: 12 MG/DL
CALCIUM SERPL-MCNC: 9.9 MG/DL
CHLORIDE SERPL-SCNC: 102 MMOL/L
CHOLEST SERPL-MCNC: 150 MG/DL
CO2 SERPL-SCNC: 27 MMOL/L
CREAT SERPL-MCNC: 0.93 MG/DL
EGFR: 118 ML/MIN/1.73M2
ESTIMATED AVERAGE GLUCOSE: 103 MG/DL
GLUCOSE SERPL-MCNC: 92 MG/DL
HBA1C MFR BLD HPLC: 5.2 %
HDLC SERPL-MCNC: 48 MG/DL
LDLC SERPL CALC-MCNC: 86 MG/DL
NONHDLC SERPL-MCNC: 103 MG/DL
POTASSIUM SERPL-SCNC: 4.7 MMOL/L
PROT SERPL-MCNC: 7.7 G/DL
SODIUM SERPL-SCNC: 143 MMOL/L
TESTOST SERPL-MCNC: 544 NG/DL
TRIGL SERPL-MCNC: 80 MG/DL

## 2023-06-05 ENCOUNTER — APPOINTMENT (OUTPATIENT)
Dept: ENDOCRINOLOGY | Facility: CLINIC | Age: 23
End: 2023-06-05
Payer: COMMERCIAL

## 2023-06-05 VITALS
HEIGHT: 67 IN | HEART RATE: 72 BPM | TEMPERATURE: 98 F | SYSTOLIC BLOOD PRESSURE: 134 MMHG | BODY MASS INDEX: 22.44 KG/M2 | DIASTOLIC BLOOD PRESSURE: 76 MMHG | WEIGHT: 143 LBS | OXYGEN SATURATION: 98 %

## 2023-06-05 PROCEDURE — 99214 OFFICE O/P EST MOD 30 MIN: CPT

## 2023-06-05 RX ORDER — SYRINGE WITH NEEDLE, 1 ML 25GX5/8"
25G X 1" SYRINGE, EMPTY DISPOSABLE MISCELLANEOUS
Qty: 1 | Refills: 3 | Status: COMPLETED | COMMUNITY
Start: 2018-07-02 | End: 2023-06-05

## 2023-06-05 RX ORDER — NEEDLES, SAFETY 18GX1 1/2"
25G X 5/8" NEEDLE, DISPOSABLE MISCELLANEOUS
Qty: 1 | Refills: 0 | Status: COMPLETED | COMMUNITY
Start: 2021-06-07 | End: 2023-06-05

## 2023-06-05 RX ORDER — SYRINGE, DISPOSABLE, 1 ML
1 ML SYRINGE, EMPTY DISPOSABLE MISCELLANEOUS
Qty: 1 | Refills: 3 | Status: ACTIVE | COMMUNITY
Start: 2023-06-05 | End: 1900-01-01

## 2023-06-05 RX ORDER — NEEDLES, SAFETY 22GX1 1/2"
25G X 5/8" NEEDLE, DISPOSABLE MISCELLANEOUS
Qty: 1 | Refills: 0 | Status: ACTIVE | COMMUNITY
Start: 2021-09-10 | End: 1900-01-01

## 2023-06-05 NOTE — PHYSICAL EXAM
[Alert] : alert [Well Nourished] : well nourished [Healthy Appearance] : healthy appearance [No Acute Distress] : no acute distress [Well Developed] : well developed [No Proptosis] : no proptosis [Normal Oropharynx] : the oropharynx was normal [No Respiratory Distress] : no respiratory distress [No Accessory Muscle Use] : no accessory muscle use [Normal Rate and Effort] : normal respiratory rate and effort [Clear to Auscultation] : lungs were clear to auscultation bilaterally [Normal S1, S2] : normal S1 and S2 [Normal Rate] : heart rate was normal [Regular Rhythm] : with a regular rhythm [No Edema] : no peripheral edema [Normal Bowel Sounds] : normal bowel sounds [Not Tender] : non-tender [Not Distended] : not distended [Soft] : abdomen soft [Oriented x3] : oriented to person, place, and time [Normal Affect] : the affect was normal [Normal Mood] : the mood was normal [de-identified] : s/p top surgery with healed incisions

## 2023-06-05 NOTE — DATA REVIEWED
[FreeTextEntry1] : Labs 5/2023:\par Testosterone 544\par CMP normal\par Lipid Profile at goal\par A1c 5.2%\par \par Labs 4/2022:\par Testosterone 714\par CMP normal\par CBC normal\par \par Labs 7/2021:\par Hb 15.7\par CMP normal\par Testosterone 758\par SHBG 26.3\par \par Labs 1/2021:\par Testosterone 665\par SHBG 29.5\par CMP normal\par Hb 15.1\par \par Labs 7/14/2020:\par Testosterone 634\par Estradiol 25\par Lipid Profile normal\par CMP normal\par CBC normal\par \par \par Labs 5/6/19:\par Estradiol 81\par LDL 78\par HDL 44\par Chol 136\par Trig 70\par CMP normal\par Testosterone 237\par \par Labs 1/2019:\par Testosterone 340.8\par Estradiol 40.93\par CMP normal\par CBC normal\par \par Labs 11/2018:\par Estradiol 54.83\par Testosterone 450\par CBC normal \par CMP normal\par \par Labs 7/24/18:\par Hb 13.1\par CMP normal\par Estradiol 39\par Testosterone 476.5\par \par Labs 3/2018:\par CBC normal\par LDL 82\par HDL 54\par Chol 145\par Trig 46\par CMP normal\par TSH 2.29\par Testosterone 35.2\par Estradiol 33\par Vit D 29.7\par DHEAS 204\par HIV nonreactive

## 2023-06-05 NOTE — REVIEW OF SYSTEMS
[Fatigue] : no fatigue [Recent Weight Gain (___ Lbs)] : no recent weight gain [Recent Weight Loss (___ Lbs)] : no recent weight loss [Visual Field Defect] : no visual field defect [Dysphagia] : no dysphagia [Dysphonia] : no dysphonia [Chest Pain] : no chest pain [Palpitations] : no palpitations [Shortness Of Breath] : no shortness of breath [Nausea] : no nausea [Constipation] : no constipation [Vomiting] : no vomiting [Diarrhea] : no diarrhea [Polyuria] : no polyuria [Joint Pain] : no joint pain [Headaches] : no headaches [Tremors] : no tremors [Depression] : no depression [Polydipsia] : no polydipsia [Cold Intolerance] : no cold intolerance [Heat Intolerance] : no heat intolerance [All other systems negative] : All other systems negative

## 2023-06-05 NOTE — ASSESSMENT
[FreeTextEntry1] : 21 y/o trans M with gender dysphoria here for hormone male transition- Discussed the treatment regimen of testosterone and risks and benefits. He is aware of risk of polycythemia, CVA, CAD, liver effects, and worsening underlying psychiatric disease possibly with testosterone use. Also discussed benefits such as facial and body hair growth, body fat redistribution, lack of periods, clitorimegaly, deeper voice etc. He is aware the effects can take anywhere from 3-6 months for onset and 2 years for maximum effect. He is satisfied thus far with the effects of testosterone therapy. Now on testosterone 50 mg IM every 10 days. Repeat today and adjust testosterone as needed for goal peak testosterone of 700. He will consider testosterone pellets in the future. Now s/p top surgery with Dr. Pena and [facility] with Dr. Santoyo. Will need continued monitoring for cancer screening in the future such as mammography, colonoscopy. No plans for pregnancy. He did not want to entertain egg freezing or fertility options. Mental health follow-up recommended. \par \par Prep time with review of labs and interval progress notes and consultations\par Discussion with patient regarding hormone regimen with management plan, risks and benefits, treatment options and goals of care answering all patients questions and addressing all concerns\par Review of labs with patient from 5/2023\par Post-visit completion charting and review\par Total Time 30 min\par \par Specifically causes, evaluation, treatment options, risks, complications, and benefits of available therapies were discussed. Questions were answered.\par \par The submitted E/M billing level for this visit reflects the total time spent on the day of the visit including face-to-face time spent with the patient, non-face-to-face review of medical records and relevant information, documentation, and asynchronous communication with the patient after a visit via phone, email, or patient’s EHR portal after the visit. \par The medical records reviewed are either scanned into the chart or reviewed with the patient using a patient’s electronic medical records portal for patients with records not available to [facility] via electronic transmission platforms from other institutions and labs. \par Time spend counseling and performing coordination of care was also included in determining the appropriate EM billing level.\par \par I have reviewed and verified information regarding the chief complaint and history recorded by the ancillary staff and/or the patient. I have independently reviewed and interpreted tests performed by other physicians and facilities as necessary. \par \par I have discussed with the patient differential diagnosis, reason for auxiliary tests if ordered, risks, benefits, alternatives, and complications of each form of therapy were discussed.

## 2023-06-05 NOTE — HISTORY OF PRESENT ILLNESS
[FreeTextEntry1] : PAVITHRA HOWARD is a 22 year old, transmale here for transgender hormone management. \par \par Pronouns: He/ Him / His \par Sexual orientation: mostly straight\par Gender identity: Male\par Name and Gender Marker Change: Done\par \par Transition history (Social, Endo, Surgical): When the patient was little, he felt he was a boy. Hated dolls and girls stuff, wanted a penis when he was [age] years old. Interested in doing all masculine things. Identical twin sister (Twin A) is supportive. Always liked and done different things. Sister supportive of the patient's transition. When he was in the women's restroom since starting college realized that he didn't want to be a women any longer. Told older sister months before coming out that he was a delmi. Spoke with parents about coming out first. When he told his mom (early Dec 2017), she initially shut down the conversation and her dad didn't understand (Nov 2017), but they let her come out publically and have "come around' and are supportive. Since then, they are supportive, and call the patient by "Pavithra" but are still mis-gendering the patient with pronouns. Came out publically in early Dec 2017.\par \par Started on testosterone 6/18/18 100 mg every 2 weeks. Now on testosterone 50 mg every 10 days. Dose lowered from 0.3 (60mg) around 4/2022 for level of 714 at 6 days post-injection. Last injection 6 days ago. Injects into buttocks. No issues with injections. Since starting testosterone, has noticed voice changes (deeper voice), bottom growth, some increased hair growth but not as much as he would like, clitoromegaly. Has increased libido. Has also had worsening acne, especially over the shoulders and upper arms. Has been using clindamycin cream. He states that he would like more hair growth and a deeper voice. He has had dysphoria about chest, and has now undergone top surgery 12/2018 periareolar with Dr. Pena and is satisfied with the results. Has seen gyn now s/p hysterectomy with Dr. Santoyo. Was not interested in egg freezing. At this time, he does not have headaches, blurry vision, chest pain, palpitations, abdominal pain, nausea, vomiting, diarrhea, constipation, leg/calf pain, lower extremity edema. \par Seen by Dr. Escobar 4/2021 and 10/2022 for vaginal bleeding s/p intercourse recommended estrace cream. Last seen 3/2023.\par Zoloft increased to 75 mg daily on 10/18/2022 with improved depression and anxiety. \par \par Education: Pt attended [employer], studying music/performance. High school: [employer]. 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 Edwin Bran ([facility] ) Pt has seen provider since June 2017, sessions are monthly. Happy to write letter. - Psychologist: Dr. Sri Quintero ( [facility] ) Pt has seen provider since May 2017, sessions are weekly. Provider is more on board with pt's transition than psychiatrist, according to pt's mother. \par

## 2023-06-13 ENCOUNTER — APPOINTMENT (OUTPATIENT)
Dept: FAMILY MEDICINE | Facility: CLINIC | Age: 23
End: 2023-06-13
Payer: COMMERCIAL

## 2023-06-13 VITALS
TEMPERATURE: 97.8 F | WEIGHT: 140 LBS | BODY MASS INDEX: 21.97 KG/M2 | HEART RATE: 80 BPM | SYSTOLIC BLOOD PRESSURE: 111 MMHG | HEIGHT: 67 IN | OXYGEN SATURATION: 98 % | DIASTOLIC BLOOD PRESSURE: 69 MMHG

## 2023-06-13 DIAGNOSIS — T78.40XA ALLERGY, UNSPECIFIED, INITIAL ENCOUNTER: ICD-10-CM

## 2023-06-13 PROCEDURE — 99213 OFFICE O/P EST LOW 20 MIN: CPT

## 2023-06-19 ENCOUNTER — TRANSCRIPTION ENCOUNTER (OUTPATIENT)
Age: 23
End: 2023-06-19

## 2023-06-21 ENCOUNTER — APPOINTMENT (OUTPATIENT)
Dept: ORTHOPEDIC SURGERY | Facility: CLINIC | Age: 23
End: 2023-06-21
Payer: COMMERCIAL

## 2023-06-21 VITALS
TEMPERATURE: 97.9 F | BODY MASS INDEX: 21.97 KG/M2 | HEART RATE: 87 BPM | OXYGEN SATURATION: 98 % | SYSTOLIC BLOOD PRESSURE: 126 MMHG | WEIGHT: 140 LBS | HEIGHT: 67 IN | DIASTOLIC BLOOD PRESSURE: 80 MMHG

## 2023-06-21 DIAGNOSIS — M25.612 STIFFNESS OF LEFT SHOULDER, NOT ELSEWHERE CLASSIFIED: ICD-10-CM

## 2023-06-21 DIAGNOSIS — S43.015D ANTERIOR DISLOCATION OF LEFT HUMERUS, SUBSEQUENT ENCOUNTER: ICD-10-CM

## 2023-06-21 PROCEDURE — 99213 OFFICE O/P EST LOW 20 MIN: CPT

## 2023-08-21 ENCOUNTER — TRANSCRIPTION ENCOUNTER (OUTPATIENT)
Age: 23
End: 2023-08-21

## 2023-11-02 ENCOUNTER — TRANSCRIPTION ENCOUNTER (OUTPATIENT)
Age: 23
End: 2023-11-02

## 2023-11-15 ENCOUNTER — TRANSCRIPTION ENCOUNTER (OUTPATIENT)
Age: 23
End: 2023-11-15

## 2023-11-16 ENCOUNTER — TRANSCRIPTION ENCOUNTER (OUTPATIENT)
Age: 23
End: 2023-11-16

## 2023-12-04 ENCOUNTER — APPOINTMENT (OUTPATIENT)
Dept: ENDOCRINOLOGY | Facility: CLINIC | Age: 23
End: 2023-12-04
Payer: COMMERCIAL

## 2023-12-04 VITALS
OXYGEN SATURATION: 98 % | WEIGHT: 143 LBS | RESPIRATION RATE: 17 BRPM | HEART RATE: 69 BPM | BODY MASS INDEX: 22.44 KG/M2 | SYSTOLIC BLOOD PRESSURE: 127 MMHG | TEMPERATURE: 98.4 F | HEIGHT: 67 IN | DIASTOLIC BLOOD PRESSURE: 72 MMHG

## 2023-12-04 PROBLEM — Z78.9 OTHER SPECIFIED HEALTH STATUS: Chronic | Status: ACTIVE | Noted: 2022-12-04

## 2023-12-04 PROCEDURE — 99214 OFFICE O/P EST MOD 30 MIN: CPT

## 2024-03-04 ENCOUNTER — TRANSCRIPTION ENCOUNTER (OUTPATIENT)
Age: 24
End: 2024-03-04

## 2024-03-07 ENCOUNTER — TRANSCRIPTION ENCOUNTER (OUTPATIENT)
Age: 24
End: 2024-03-07

## 2024-03-10 ENCOUNTER — TRANSCRIPTION ENCOUNTER (OUTPATIENT)
Age: 24
End: 2024-03-10

## 2024-03-11 ENCOUNTER — APPOINTMENT (OUTPATIENT)
Dept: OBGYN | Facility: CLINIC | Age: 24
End: 2024-03-11
Payer: COMMERCIAL

## 2024-03-11 VITALS
SYSTOLIC BLOOD PRESSURE: 132 MMHG | DIASTOLIC BLOOD PRESSURE: 74 MMHG | HEIGHT: 67 IN | BODY MASS INDEX: 22.6 KG/M2 | WEIGHT: 144 LBS

## 2024-03-11 DIAGNOSIS — Z01.419 ENCOUNTER FOR GYNECOLOGICAL EXAMINATION (GENERAL) (ROUTINE) W/OUT ABNORMAL FINDINGS: ICD-10-CM

## 2024-03-11 PROCEDURE — 99395 PREV VISIT EST AGE 18-39: CPT

## 2024-03-11 RX ORDER — ESTRADIOL 0.1 MG/G
0.1 CREAM VAGINAL
Qty: 1 | Refills: 5 | Status: ACTIVE | COMMUNITY
Start: 2024-03-11 | End: 1900-01-01

## 2024-05-05 ENCOUNTER — TRANSCRIPTION ENCOUNTER (OUTPATIENT)
Age: 24
End: 2024-05-05

## 2024-05-06 ENCOUNTER — TRANSCRIPTION ENCOUNTER (OUTPATIENT)
Age: 24
End: 2024-05-06

## 2024-05-10 ENCOUNTER — TRANSCRIPTION ENCOUNTER (OUTPATIENT)
Age: 24
End: 2024-05-10

## 2024-05-13 ENCOUNTER — APPOINTMENT (OUTPATIENT)
Dept: FAMILY MEDICINE | Facility: CLINIC | Age: 24
End: 2024-05-13
Payer: COMMERCIAL

## 2024-05-13 VITALS
RESPIRATION RATE: 16 BRPM | WEIGHT: 146 LBS | OXYGEN SATURATION: 98 % | DIASTOLIC BLOOD PRESSURE: 85 MMHG | HEART RATE: 94 BPM | BODY MASS INDEX: 22.91 KG/M2 | HEIGHT: 67 IN | TEMPERATURE: 98.1 F | SYSTOLIC BLOOD PRESSURE: 128 MMHG

## 2024-05-13 DIAGNOSIS — F41.8 OTHER SPECIFIED ANXIETY DISORDERS: ICD-10-CM

## 2024-05-13 DIAGNOSIS — Z00.00 ENCOUNTER FOR GENERAL ADULT MEDICAL EXAMINATION W/OUT ABNORMAL FINDINGS: ICD-10-CM

## 2024-05-13 PROCEDURE — 99395 PREV VISIT EST AGE 18-39: CPT

## 2024-05-13 RX ORDER — TESTOSTERONE CYPIONATE 200 MG/ML
200 INJECTION, SOLUTION INTRAMUSCULAR
Qty: 2 | Refills: 0 | Status: ACTIVE | COMMUNITY
Start: 2018-07-02

## 2024-05-13 RX ORDER — EPINEPHRINE 0.3 MG/.3ML
0.3 INJECTION INTRAMUSCULAR
Qty: 1 | Refills: 2 | Status: ACTIVE | COMMUNITY
Start: 2018-11-14 | End: 1900-01-01

## 2024-05-28 ENCOUNTER — TRANSCRIPTION ENCOUNTER (OUTPATIENT)
Age: 24
End: 2024-05-28

## 2024-05-28 DIAGNOSIS — B34.9 VIRAL INFECTION, UNSPECIFIED: ICD-10-CM

## 2024-06-02 ENCOUNTER — TRANSCRIPTION ENCOUNTER (OUTPATIENT)
Age: 24
End: 2024-06-02

## 2024-06-03 ENCOUNTER — TRANSCRIPTION ENCOUNTER (OUTPATIENT)
Age: 24
End: 2024-06-03

## 2024-06-10 ENCOUNTER — APPOINTMENT (OUTPATIENT)
Dept: ENDOCRINOLOGY | Facility: CLINIC | Age: 24
End: 2024-06-10
Payer: COMMERCIAL

## 2024-06-10 VITALS
SYSTOLIC BLOOD PRESSURE: 153 MMHG | HEART RATE: 70 BPM | DIASTOLIC BLOOD PRESSURE: 72 MMHG | BODY MASS INDEX: 22.29 KG/M2 | HEIGHT: 67 IN | OXYGEN SATURATION: 98 % | WEIGHT: 142 LBS

## 2024-06-10 DIAGNOSIS — F64.0 TRANSSEXUALISM: ICD-10-CM

## 2024-06-10 PROCEDURE — 99214 OFFICE O/P EST MOD 30 MIN: CPT

## 2024-06-10 NOTE — DATA REVIEWED
[FreeTextEntry1] : Labs 5/2024: Hb 15.8 CMP normal except glucose of 104 Chol 162 TG 85 HDL 45  A1c 5.2% TSH 4.00 Testosterone 289  Labs 12/1/2023: Testosterone 355 CBC normal CMP normal  Labs 5/2023: Testosterone 544 CMP normal Lipid Profile at goal A1c 5.2%  Labs 4/2022: Testosterone 714 CMP normal CBC normal  Labs 7/2021: Hb 15.7 CMP normal Testosterone 758 SHBG 26.3  Labs 1/2021: Testosterone 665 SHBG 29.5 CMP normal Hb 15.1  Labs 7/14/2020: Testosterone 634 Estradiol 25 Lipid Profile normal CMP normal CBC normal   Labs 5/6/19: Estradiol 81 LDL 78 HDL 44 Chol 136 Trig 70 CMP normal Testosterone 237  Labs 1/2019: Testosterone 340.8 Estradiol 40.93 CMP normal CBC normal  Labs 11/2018: Estradiol 54.83 Testosterone 450 CBC normal  CMP normal  Labs 7/24/18: Hb 13.1 CMP normal Estradiol 39 Testosterone 476.5  Labs 3/2018: CBC normal LDL 82 HDL 54 Chol 145 Trig 46 CMP normal TSH 2.29 Testosterone 35.2 Estradiol 33 Vit D 29.7 DHEAS 204 HIV nonreactive

## 2024-06-10 NOTE — PHYSICAL EXAM
[Alert] : alert [Well Nourished] : well nourished [Healthy Appearance] : healthy appearance [No Acute Distress] : no acute distress [Well Developed] : well developed [No Proptosis] : no proptosis [Normal Oropharynx] : the oropharynx was normal [No Respiratory Distress] : no respiratory distress [No Accessory Muscle Use] : no accessory muscle use [Normal Rate and Effort] : normal respiratory rate and effort [Clear to Auscultation] : lungs were clear to auscultation bilaterally [Normal S1, S2] : normal S1 and S2 [Normal Rate] : heart rate was normal [Regular Rhythm] : with a regular rhythm [No Edema] : no peripheral edema [Normal Bowel Sounds] : normal bowel sounds [Not Tender] : non-tender [Not Distended] : not distended [Soft] : abdomen soft [Oriented x3] : oriented to person, place, and time [Normal Affect] : the affect was normal [Normal Mood] : the mood was normal [de-identified] : s/p top surgery with healed incisions

## 2024-06-10 NOTE — HISTORY OF PRESENT ILLNESS
[FreeTextEntry1] : PAVITHRA HOWARD is a 23 year old, transmale here for transgender hormone management.   Pronouns: He/ Him / His  Sexual orientation: mostly straight Gender identity: Male Name and Gender Marker Change: Done  Transition history (Social, Endo, Surgical): When the patient was little, he felt he was a boy. Hated dolls and girls stuff, wanted a penis when he was [age] years old. Interested in doing all masculine things. Identical twin sister (Twin A) is supportive. Always liked and done different things. Sister supportive of the patient's transition. When he was in the women's restroom since starting college realized that he didn't want to be a women any longer. Told older sister months before coming out that he was a delmi. Spoke with parents about coming out first. When he told his mom (early Dec 2017), she initially shut down the conversation and her dad didn't understand (Nov 2017), but they let her come out publically and have "come around' and are supportive. Since then, they are supportive, and call the patient by "Pavithra" but are still mis-gendering the patient with pronouns. Came out publically in early Dec 2017.  Started on testosterone 6/18/18 100 mg every 2 weeks. Now on testosterone 50 mg (0.25ml) every 10 days. Dose lowered from 0.3 (60mg) around 4/2022 for level of 714 at 6 days post-injection. Last testosterone recently done with level of 355 with injection 9 days prior. Injects into buttocks. No issues with injections. Since starting testosterone, has noticed voice changes (deeper voice), bottom growth, some increased hair growth but not as much as he would like, clitoromegaly. Has increased libido. Has also had worsening acne, especially over the shoulders and upper arms. Has been using clindamycin cream. He states that he would like more hair growth and a deeper voice. He has had dysphoria about chest, and has now undergone top surgery 12/2018 periareolar with Dr. Pena and is satisfied with the results. Has seen gyn now s/p hysterectomy with Dr. Santoyo. Was not interested in egg freezing. At this time, he does not have headaches, blurry vision, chest pain, palpitations, abdominal pain, nausea, vomiting, diarrhea, constipation, leg/calf pain, lower extremity edema.  Seen by Dr. Escobar 4/2021 and 10/2022 for vaginal bleeding s/p intercourse recommended estrace cream. Last seen 3/2023. Zoloft increased to 75 mg daily on 10/18/2022 also on Abilify with improved depression and anxiety.   Education: Pt attended [employer], studying music/performance. High school: [employer]. College: [employer], Music (composition and tech) Now excited about going to school for music.  Coming out/Family support: Mother is supportive, dad is as well. Psychiatrist: Dr. Edwin Bran ([facility] ) Pt has seen provider since June 2017, sessions are monthly. Happy to write letter. - Psychologist: Dr. Sri Quintero ( [facility] ) Pt has seen provider since May 2017, sessions are weekly. Provider is more on board with pt's transition than psychiatrist, according to pt's mother.

## 2024-06-10 NOTE — ASSESSMENT
[FreeTextEntry1] : 24 y/o trans M with gender dysphoria here for hormone male transition- Discussed the treatment regimen of testosterone and risks and benefits. He is aware of risk of polycythemia, CVA, CAD, liver effects, and worsening underlying psychiatric disease possibly with testosterone use. Also discussed benefits such as facial and body hair growth, body fat redistribution, lack of periods, clitorimegaly, deeper voice etc. He is aware the effects can take anywhere from 3-6 months for onset and 2 years for maximum effect. He is satisfied thus far with the effects of testosterone therapy. Now on testosterone 50 mg IM every 10 days. Recent levels at goal. Repeat with next lab work and adjust testosterone as needed for goal peak testosterone of 700. He will consider testosterone pellets in the future. Now s/p top surgery with Dr. Pena and [facility] with Dr. Santoyo. Will need continued monitoring for cancer screening in the future such as mammography, colonoscopy. No plans for pregnancy. He did not want to entertain egg freezing or fertility options. Mental health follow-up recommended.    Prep time with review of labs and interval progress notes and consultations  Discussion with patient regarding hormone regimen with management plan, risks and benefits, treatment options and goals of care answering all patients questions and addressing all concerns  Review of labs with patient from 12/1/2023  Post-visit completion charting and review  Total Time 30 min

## 2024-07-20 ENCOUNTER — TRANSCRIPTION ENCOUNTER (OUTPATIENT)
Age: 24
End: 2024-07-20

## 2024-07-22 ENCOUNTER — TRANSCRIPTION ENCOUNTER (OUTPATIENT)
Age: 24
End: 2024-07-22

## 2024-08-21 NOTE — H&P PST ADULT - NS MD [facility] INPLANTS MED DEV
Prednisone Counseling:  I discussed with the patient the risks of prolonged use of prednisone including but not limited to weight gain, insomnia, osteoporosis, mood changes, diabetes, susceptibility to infection, glaucoma and high blood pressure.  In cases where prednisone use is prolonged, patients should be monitored with blood pressure checks, serum glucose levels and an eye exam.  Additionally, the patient may need to be placed on GI prophylaxis, PCP prophylaxis, and calcium and vitamin D supplementation and/or a bisphosphonate.  The patient verbalized understanding of the proper use and the possible adverse effects of prednisone.  All of the patient's questions and concerns were addressed. Zoryve Counseling:  I discussed with the patient that Zoryve is not for use in the eyes, mouth or vagina. The most commonly reported side effects include diarrhea, headache, insomnia, application site pain, upper respiratory tract infections, and urinary tract infections.  All of the patient's questions and concerns were addressed. Hydroxyzine Counseling: Patient advised that the medication is sedating and not to drive a car after taking this medication.  Patient informed of potential adverse effects including but not limited to dry mouth, urinary retention, and blurry vision.  The patient verbalized understanding of the proper use and possible adverse effects of hydroxyzine.  All of the patient's questions and concerns were addressed. Clofazimine Counseling:  I discussed with the patient the risks of clofazimine including but not limited to skin and eye pigmentation, liver damage, nausea/vomiting, gastrointestinal bleeding and allergy. VTAMA Counseling: I discussed with the patient that VTAMA is not for use in the eyes, mouth or mouth. They should call the office if they develop any signs of allergic reactions to VTAMA. The patient verbalized understanding of the proper use and possible adverse effects of VTAMA.  All of the patient's questions and concerns were addressed. Erivedge Pregnancy And Lactation Text: This medication is Pregnancy Category X and is absolutely contraindicated during pregnancy. It is unknown if it is excreted in breast milk. Enbrel Counseling:  I discussed with the patient the risks of etanercept including but not limited to myelosuppression, immunosuppression, autoimmune hepatitis, demyelinating diseases, lymphoma, and infections.  The patient understands that monitoring is required including a PPD at baseline and must alert us or the primary physician if symptoms of infection or other concerning signs are noted. Eucrisa Pregnancy And Lactation Text: This medication has not been assigned a Pregnancy Risk Category but animal studies failed to show danger with the topical medication. It is unknown if the medication is excreted in breast milk. Qbrexza Counseling:  I discussed with the patient the risks of Qbrexza including but not limited to headache, mydriasis, blurred vision, dry eyes, nasal dryness, dry mouth, dry throat, dry skin, urinary hesitation, and constipation.  Local skin reactions including erythema, burning, stinging, and itching can also occur. Ketoconazole Counseling:   Patient counseled regarding improving absorption with orange juice.  Adverse effects include but are not limited to breast enlargement, headache, diarrhea, nausea, upset stomach, liver function test abnormalities, taste disturbance, and stomach pain.  There is a rare possibility of liver failure that can occur when taking ketoconazole. The patient understands that monitoring of LFTs may be required, especially at baseline. The patient verbalized understanding of the proper use and possible adverse effects of ketoconazole.  All of the patient's questions and concerns were addressed. Spevigo Pregnancy And Lactation Text: The risk during pregnancy and breastfeeding is uncertain with this medication. This medication does cross the placenta. It is unknown if this medication is found in breast milk. Valtrex Pregnancy And Lactation Text: this medication is Pregnancy Category B and is considered safe during pregnancy. This medication is not directly found in breast milk but it's metabolite acyclovir is present. Finasteride Male Counseling: Finasteride Counseling:  I discussed with the patient the risks of use of finasteride including but not limited to decreased libido, decreased ejaculate volume, gynecomastia, and depression. Women should not handle medication.  All of the patient's questions and concerns were addressed. Propranolol Counseling:  I discussed with the patient the risks of propranolol including but not limited to low heart rate, low blood pressure, low blood sugar, restlessness and increased cold sensitivity. They should call the office if they experience any of these side effects. Infliximab Pregnancy And Lactation Text: This medication is Pregnancy Category B and is considered safe during pregnancy. It is unknown if this medication is excreted in breast milk. Rinvoq Counseling: I discussed with the patient the risks of Rinvoq therapy including but not limited to upper respiratory tract infections, shingles, cold sores, bronchitis, nausea, cough, fever, acne, and headache. Live vaccines should be avoided.  This medication has been linked to serious infections; higher rate of mortality; malignancy and lymphoproliferative disorders; major adverse cardiovascular events; thrombosis; thrombocytopenia, anemia, and neutropenia; lipid elevations; liver enzyme elevations; and gastrointestinal perforations. Mirvaso Counseling: Mirvaso is a topical medication which can decrease superficial blood flow where applied. Side effects are uncommon and include stinging, redness and allergic reactions. Tetracycline Counseling: Patient counseled regarding possible photosensitivity and increased risk for sunburn.  Patient instructed to avoid sunlight, if possible.  When exposed to sunlight, patients should wear protective clothing, sunglasses, and sunscreen.  The patient was instructed to call the office immediately if the following severe adverse effects occur:  hearing changes, easy bruising/bleeding, severe headache, or vision changes.  The patient verbalized understanding of the proper use and possible adverse effects of tetracycline.  All of the patient's questions and concerns were addressed. Patient understands to avoid pregnancy while on therapy due to potential birth defects. Topical Retinoid Pregnancy And Lactation Text: This medication is Pregnancy Category C. It is unknown if this medication is excreted in breast milk. Topical Steroids Counseling: I discussed with the patient that prolonged use of topical steroids can result in the increased appearance of superficial blood vessels (telangiectasias), lightening (hypopigmentation) and thinning of the skin (atrophy).  Patient understands to avoid using high potency steroids in skin folds, the groin or the face.  The patient verbalized understanding of the proper use and possible adverse effects of topical steroids.  All of the patient's questions and concerns were addressed. Clindamycin Counseling: I counseled the patient regarding use of clindamycin as an antibiotic for prophylactic and/or therapeutic purposes. Clindamycin is active against numerous classes of bacteria, including skin bacteria. Side effects may include nausea, diarrhea, gastrointestinal upset, rash, hives, yeast infections, and in rare cases, colitis. Bimzelx Counseling:  I discussed with the patient the risks of Bimzelx including but not limited to depression, immunosuppression, allergic reactions and infections.  The patient understands that monitoring is required including a PPD at baseline and must alert us or the primary physician if symptoms of infection or other concerning signs are noted. Aklief counseling:  Patient advised to apply a pea-sized amount only at bedtime and wait 30 minutes after washing their face before applying.  If too drying, patient may add a non-comedogenic moisturizer.  The most commonly reported side effects including irritation, redness, scaling, dryness, stinging, burning, itching, and increased risk of sunburn.  The patient verbalized understanding of the proper use and possible adverse effects of retinoids.  All of the patient's questions and concerns were addressed. Azathioprine Pregnancy And Lactation Text: This medication is Pregnancy Category D and isn't considered safe during pregnancy. It is unknown if this medication is excreted in breast milk. Cantharidin Counseling:  I discussed with the patient the risks of Cantharidin including but not limited to pain, redness, burning, itching, and blistering. Bexarotene Counseling:  I discussed with the patient the risks of bexarotene including but not limited to hair loss, dry lips/skin/eyes, liver abnormalities, hyperlipidemia, pancreatitis, depression/suicidal ideation, photosensitivity, drug rash/allergic reactions, hypothyroidism, anemia, leukopenia, infection, cataracts, and teratogenicity.  Patient understands that they will need regular blood tests to check lipid profile, liver function tests, white blood cell count, thyroid function tests and pregnancy test if applicable. Nsaids Pregnancy And Lactation Text: These medications are considered safe up to 30 weeks gestation. It is excreted in breast milk. Metronidazole Counseling:  I discussed with the patient the risks of metronidazole including but not limited to seizures, nausea/vomiting, a metallic taste in the mouth, nausea/vomiting and severe allergy. Methotrexate Pregnancy And Lactation Text: This medication is Pregnancy Category X and is known to cause fetal harm. This medication is excreted in breast milk. Vtama Pregnancy And Lactation Text: It is unknown if this medication can cause problems during pregnancy and breastfeeding. Glycopyrrolate Counseling:  I discussed with the patient the risks of glycopyrrolate including but not limited to skin rash, drowsiness, dry mouth, difficulty urinating, and blurred vision. Hydroquinone Counseling:  Patient advised that medication may result in skin irritation, lightening (hypopigmentation), dryness, and burning.  In the event of skin irritation, the patient was advised to reduce the amount of the drug applied or use it less frequently.  Rarely, spots that are treated with hydroquinone can become darker (pseudoochronosis).  Should this occur, patient instructed to stop medication and call the office. The patient verbalized understanding of the proper use and possible adverse effects of hydroquinone.  All of the patient's questions and concerns were addressed. Hydroxyzine Pregnancy And Lactation Text: This medication is not safe during pregnancy and should not be taken. It is also excreted in breast milk and breast feeding isn't recommended. Include Pregnancy/Lactation Warning?: No Valtrex Counseling: I discussed with the patient the risks of valacyclovir including but not limited to kidney damage, nausea, vomiting and severe allergy.  The patient understands that if the infection seems to be worsening or is not improving, they are to call. Cantharidin Pregnancy And Lactation Text: This medication has not been proven safe during pregnancy. It is unknown if this medication is excreted in breast milk. Erivedge Counseling- I discussed with the patient the risks of Erivedge including but not limited to nausea, vomiting, diarrhea, constipation, weight loss, changes in the sense of taste, decreased appetite, muscle spasms, and hair loss.  The patient verbalized understanding of the proper use and possible adverse effects of Erivedge.  All of the patient's questions and concerns were addressed. Spevigo Counseling: I discussed with the patient the risks of Spevigo including but not limited to fatigue, nasuea, vomiting, headache, pruritus, urinary tract infection, an infusion related reactions.  The patient understands that monitoring is required including screening for tuberculosis at baseline and yearly screening thereafter while continuing Spevigo therapy. They should contact us if symptoms of infection or other concerning signs are noted. Qbrexza Pregnancy And Lactation Text: There is no available data on Qbrexza use in pregnant women.  There is no available data on Qbrexza use in lactation. Olumiant Pregnancy And Lactation Text: Based on animal studies, Olumiant may cause embryo-fetal harm when administered to pregnant women.  The medication should not be used in pregnancy.  Breastfeeding is not recommended during treatment. Ketoconazole Pregnancy And Lactation Text: This medication is Pregnancy Category C and it isn't know if it is safe during pregnancy. It is also excreted in breast milk and breast feeding isn't recommended. Tazorac Counseling:  Patient advised that medication is irritating and drying.  Patient may need to apply sparingly and wash off after an hour before eventually leaving it on overnight.  The patient verbalized understanding of the proper use and possible adverse effects of tazorac.  All of the patient's questions and concerns were addressed. Bimzelx Pregnancy And Lactation Text: This medication crosses the placenta and the safety is uncertain during pregnancy. It is unknown if this medication is present in breast milk. Cephalexin Pregnancy And Lactation Text: This medication is Pregnancy Category B and considered safe during pregnancy.  It is also excreted in breast milk but can be used safely for shorter doses. Oxybutynin Pregnancy And Lactation Text: This medication is Pregnancy Category B and is considered safe during pregnancy. It is unknown if it is excreted in breast milk. Rituxan Counseling:  I discussed with the patient the risks of Rituxan infusions. Side effects can include infusion reactions, severe drug rashes including mucocutaneous reactions, reactivation of latent hepatitis and other infections and rarely progressive multifocal leukoencephalopathy.  All of the patient's questions and concerns were addressed. Dutasteride Pregnancy And Lactation Text: This medication is absolutely contraindicated in women, especially during pregnancy and breast feeding. Feminization of male fetuses is possible if taking while pregnant. Mirvaso Pregnancy And Lactation Text: This medication has not been assigned a Pregnancy Risk Category. It is unknown if the medication is excreted in breast milk. Tetracycline Pregnancy And Lactation Text: This medication is Pregnancy Category D and not consider safe during pregnancy. It is also excreted in breast milk. Topical Steroids Applications Pregnancy And Lactation Text: Most topical steroids are considered safe to use during pregnancy and lactation.  Any topical steroid applied to the breast or nipple should be washed off before breastfeeding. Azathioprine Counseling:  I discussed with the patient the risks of azathioprine including but not limited to myelosuppression, immunosuppression, hepatotoxicity, lymphoma, and infections.  The patient understands that monitoring is required including baseline LFTs, Creatinine, possible TPMP genotyping and weekly CBCs for the first month and then every 2 weeks thereafter.  The patient verbalized understanding of the proper use and possible adverse effects of azathioprine.  All of the patient's questions and concerns were addressed. Acitretin Pregnancy And Lactation Text: This medication is Pregnancy Category X and should not be given to women who are pregnant or may become pregnant in the future. This medication is excreted in breast milk. Erythromycin Pregnancy And Lactation Text: This medication is Pregnancy Category B and is considered safe during pregnancy. It is also excreted in breast milk. Nsaids Counseling: NSAID Counseling: I discussed with the patient that NSAIDs should be taken with food. Prolonged use of NSAIDs can result in the development of stomach ulcers.  Patient advised to stop taking NSAIDs if abdominal pain occurs.  The patient verbalized understanding of the proper use and possible adverse effects of NSAIDs.  All of the patient's questions and concerns were addressed. 5-Fu Counseling: 5-Fluorouracil Counseling:  I discussed with the patient the risks of 5-fluorouracil including but not limited to erythema, scaling, itching, weeping, crusting, and pain. Arava Counseling:  Patient counseled regarding adverse effects of Arava including but not limited to nausea, vomiting, abnormalities in liver function tests. Patients may develop mouth sores, rash, diarrhea, and abnormalities in blood counts. The patient understands that monitoring is required including LFTs and blood counts.  There is a rare possibility of scarring of the liver and lung problems that can occur when taking methotrexate. Persistent nausea, loss of appetite, pale stools, dark urine, cough, and shortness of breath should be reported immediately. Patient advised to discontinue Arava treatment and consult with a physician prior to attempting conception. The patient will have to undergo a treatment to eliminate Arava from the body prior to conception. Gabapentin Pregnancy And Lactation Text: This medication is Pregnancy Category C and isn't considered safe during pregnancy. It is excreted in breast milk. Methotrexate Counseling:  Patient counseled regarding adverse effects of methotrexate including but not limited to nausea, vomiting, abnormalities in liver function tests. Patients may develop mouth sores, rash, diarrhea, and abnormalities in blood counts. The patient understands that monitoring is required including LFT's and blood counts.  There is a rare possibility of scarring of the liver and lung problems that can occur when taking methotrexate. Persistent nausea, loss of appetite, pale stools, dark urine, cough, and shortness of breath should be reported immediately. Patient advised to discontinue methotrexate treatment at least three months before attempting to become pregnant.  I discussed the need for folate supplements while taking methotrexate.  These supplements can decrease side effects during methotrexate treatment. The patient verbalized understanding of the proper use and possible adverse effects of methotrexate.  All of the patient's questions and concerns were addressed. Tranexamic Acid Pregnancy And Lactation Text: It is unknown if this medication is safe during pregnancy or breast feeding. Humira Counseling:  I discussed with the patient the risks of adalimumab including but not limited to myelosuppression, immunosuppression, autoimmune hepatitis, demyelinating diseases, lymphoma, and serious infections.  The patient understands that monitoring is required including a PPD at baseline and must alert us or the primary physician if symptoms of infection or other concerning signs are noted. Skyrizi Pregnancy And Lactation Text: The risk during pregnancy and breastfeeding is uncertain with this medication. Olumiant Counseling: I discussed with the patient the risks of Olumiant therapy including but not limited to upper respiratory tract infections, shingles, cold sores, and nausea. Live vaccines should be avoided.  This medication has been linked to serious infections; higher rate of mortality; malignancy and lymphoproliferative disorders; major adverse cardiovascular events; thrombosis; gastrointestinal perforations; neutropenia; lymphopenia; anemia; liver enzyme elevations; and lipid elevations. Rhofade Counseling: Rhofade is a topical medication which can decrease superficial blood flow where applied. Side effects are uncommon and include stinging, redness and allergic reactions. Terbinafine Counseling: Patient counseling regarding adverse effects of terbinafine including but not limited to headache, diarrhea, rash, upset stomach, liver function test abnormalities, itching, taste/smell disturbance, nausea, abdominal pain, and flatulence.  There is a rare possibility of liver failure that can occur when taking terbinafine.  The patient understands that a baseline LFT and kidney function test may be required. The patient verbalized understanding of the proper use and possible adverse effects of terbinafine.  All of the patient's questions and concerns were addressed. Detail Level: Simple Dutasteride Female Counseling: Dutasteride Counseling:  I discussed with the patient the risks of use of dutasteride including but not limited to decreased libido and sexual dysfunction. Explained the teratogenic nature of the medication and stressed the importance of not getting pregnant during treatment. All of the patient's questions and concerns were addressed. Oxybutynin Counseling:  I discussed with the patient the risks of oxybutynin including but not limited to skin rash, drowsiness, dry mouth, difficulty urinating, and blurred vision. Rituxan Pregnancy And Lactation Text: This medication is Pregnancy Category C and it isn't know if it is safe during pregnancy. It is unknown if this medication is excreted in breast milk but similar antibodies are known to be excreted. Tazorac Pregnancy And Lactation Text: This medication is not safe during pregnancy. It is unknown if this medication is excreted in breast milk. Topical Sulfur Applications Counseling: Topical Sulfur Counseling: Patient counseled that this medication may cause skin irritation or allergic reactions.  In the event of skin irritation, the patient was advised to reduce the amount of the drug applied or use it less frequently.   The patient verbalized understanding of the proper use and possible adverse effects of topical sulfur application.  All of the patient's questions and concerns were addressed. Albendazole Counseling:  I discussed with the patient the risks of albendazole including but not limited to cytopenia, kidney damage, nausea/vomiting and severe allergy.  The patient understands that this medication is being used in an off-label manner. Cimzia Counseling:  I discussed with the patient the risks of Cimzia including but not limited to immunosuppression, allergic reactions and infections.  The patient understands that monitoring is required including a PPD at baseline and must alert us or the primary physician if symptoms of infection or other concerning signs are noted. Niacinamide Pregnancy And Lactation Text: These medications are considered safe during pregnancy. 5-Fu Pregnancy And Lactation Text: This medication is Pregnancy Category X and contraindicated in pregnancy and in women who may become pregnant. It is unknown if this medication is excreted in breast milk. Xolair Pregnancy And Lactation Text: This medication is Pregnancy Category B and is considered safe during pregnancy. This medication is excreted in breast milk. Opzelura Counseling:  I discussed with the patient the risks of Opzelura including but not limited to nasopharngitis, bronchitis, ear infection, eosinophila, hives, diarrhea, folliculitis, tonsillitis, and rhinorrhea.  Taken orally, this medication has been linked to serious infections; higher rate of mortality; malignancy and lymphoproliferative disorders; major adverse cardiovascular events; thrombosis; thrombocytopenia, anemia, and neutropenia; and lipid elevations. Acitretin Counseling:  I discussed with the patient the risks of acitretin including but not limited to hair loss, dry lips/skin/eyes, liver damage, hyperlipidemia, depression/suicidal ideation, photosensitivity.  Serious rare side effects can include but are not limited to pancreatitis, pseudotumor cerebri, bony changes, clot formation/stroke/heart attack.  Patient understands that alcohol is contraindicated since it can result in liver toxicity and significantly prolong the elimination of the drug by many years. Gabapentin Counseling: I discussed with the patient the risks of gabapentin including but not limited to dizziness, somnolence, fatigue and ataxia. Erythromycin Counseling:  I discussed with the patient the risks of erythromycin including but not limited to GI upset, allergic reaction, drug rash, diarrhea, increase in liver enzymes, and yeast infections. Cyclosporine Pregnancy And Lactation Text: This medication is Pregnancy Category C and it isn't know if it is safe during pregnancy. This medication is excreted in breast milk. Cephalexin Counseling: I counseled the patient regarding use of cephalexin as an antibiotic for prophylactic and/or therapeutic purposes. Cephalexin (commonly prescribed under brand name Keflex) is a cephalosporin antibiotic which is active against numerous classes of bacteria, including most skin bacteria. Side effects may include nausea, diarrhea, gastrointestinal upset, rash, hives, yeast infections, and in rare cases, hepatitis, kidney disease, seizures, fever, confusion, neurologic symptoms, and others. Patients with severe allergies to penicillin medications are cautioned that there is about a 10% incidence of cross-reactivity with cephalosporins. When possible, patients with penicillin allergies should use alternatives to cephalosporins for antibiotic therapy. Imiquimod Counseling:  I discussed with the patient the risks of imiquimod including but not limited to erythema, scaling, itching, weeping, crusting, and pain.  Patient understands that the inflammatory response to imiquimod is variable from person to person and was educated regarded proper titration schedule.  If flu-like symptoms develop, patient knows to discontinue the medication and contact us. Quinolones Counseling:  I discussed with the patient the risks of fluoroquinolones including but not limited to GI upset, allergic reaction, drug rash, diarrhea, dizziness, photosensitivity, yeast infections, liver function test abnormalities, tendonitis/tendon rupture. Fluconazole Counseling:  Patient counseled regarding adverse effects of fluconazole including but not limited to headache, diarrhea, nausea, upset stomach, liver function test abnormalities, taste disturbance, and stomach pain.  There is a rare possibility of liver failure that can occur when taking fluconazole.  The patient understands that monitoring of LFTs and kidney function test may be required, especially at baseline. The patient verbalized understanding of the proper use and possible adverse effects of fluconazole.  All of the patient's questions and concerns were addressed. Tranexamic Acid Counseling:  Patient advised of the small risk of bleeding problems with tranexamic acid. They were also instructed to call if they developed any nausea, vomiting or diarrhea. All of the patient's questions and concerns were addressed. Spironolactone Pregnancy And Lactation Text: This medication can cause feminization of the male fetus and should be avoided during pregnancy. The active metabolite is also found in breast milk. Otezla Pregnancy And Lactation Text: This medication is Pregnancy Category C and it isn't known if it is safe during pregnancy. It is unknown if it is excreted in breast milk. Litfulo Pregnancy And Lactation Text: Based on animal studies, Lifulo may cause embryo-fetal harm when administered to pregnant women.  The medication should not be used in pregnancy.  Breastfeeding is not recommended during treatment. Topical Clindamycin Counseling: Patient counseled that this medication may cause skin irritation or allergic reactions.  In the event of skin irritation, the patient was advised to reduce the amount of the drug applied or use it less frequently.   The patient verbalized understanding of the proper use and possible adverse effects of clindamycin.  All of the patient's questions and concerns were addressed. Opioid Pregnancy And Lactation Text: These medications can lead to premature delivery and should be avoided during pregnancy. These medications are also present in breast milk in small amounts. Terbinafine Pregnancy And Lactation Text: This medication is Pregnancy Category B and is considered safe during pregnancy. It is also excreted in breast milk and breast feeding isn't recommended. Skyrizi Counseling: I discussed with the patient the risks of risankizumab-rzaa including but not limited to immunosuppression, and serious infections.  The patient understands that monitoring is required including a PPD at baseline and must alert us or the primary physician if symptoms of infection or other concerning signs are noted. Dutasteride Male Counseling: Dustasteride Counseling:  I discussed with the patient the risks of use of dutasteride including but not limited to decreased libido, decreased ejaculate volume, and gynecomastia. Women who can become pregnant should not handle medication.  All of the patient's questions and concerns were addressed. Topical Sulfur Applications Pregnancy And Lactation Text: This medication is Pregnancy Category C and has an unknown safety profile during pregnancy. It is unknown if this topical medication is excreted in breast milk. Siliq Counseling:  I discussed with the patient the risks of Siliq including but not limited to new or worsening depression, suicidal thoughts and behavior, immunosuppression, malignancy, posterior leukoencephalopathy syndrome, and serious infections.  The patient understands that monitoring is required including a PPD at baseline and must alert us or the primary physician if symptoms of infection or other concerning signs are noted. There is also a special program designed to monitor depression which is required with Siliq. Albendazole Pregnancy And Lactation Text: This medication is Pregnancy Category C and it isn't known if it is safe during pregnancy. It is also excreted in breast milk. Xolair Counseling:  Patient informed of potential adverse effects including but not limited to fever, muscle aches, rash and allergic reactions.  The patient verbalized understanding of the proper use and possible adverse effects of Xolair.  All of the patient's questions and concerns were addressed. Dapsone Pregnancy And Lactation Text: This medication is Pregnancy Category C and is not considered safe during pregnancy or breast feeding. Niacinamide Counseling: I recommended taking niacin or niacinamide, also know as vitamin B3, twice daily. Recent evidence suggests that taking vitamin B3 (500 mg twice daily) can reduce the risk of actinic keratoses and non-melanoma skin cancers. Side effects of vitamin B3 include flushing and headache. Doxycycline Pregnancy And Lactation Text: This medication is Pregnancy Category D and not consider safe during pregnancy. It is also excreted in breast milk but is considered safe for shorter treatment courses. Bactrim Pregnancy And Lactation Text: This medication is Pregnancy Category D and is known to cause fetal risk.  It is also excreted in breast milk. Cimzia Pregnancy And Lactation Text: This medication crosses the placenta but can be considered safe in certain situations. Cimzia may be excreted in breast milk. Drysol Counseling:  I discussed with the patient the risks of drysol/aluminum chloride including but not limited to skin rash, itching, irritation, burning. Opzelura Pregnancy And Lactation Text: There is insufficient data to evaluate drug-associated risk for major birth defects, miscarriage, or other adverse maternal or fetal outcomes.  There is a pregnancy registry that monitors pregnancy outcomes in pregnant persons exposed to the medication during pregnancy.  It is unknown if this medication is excreted in breast milk.  Do not breastfeed during treatment and for about 4 weeks after the last dose. Fluconazole Pregnancy And Lactation Text: This medication is Pregnancy Category C and it isn't know if it is safe during pregnancy. It is also excreted in breast milk. None Cyclosporine Counseling:  I discussed with the patient the risks of cyclosporine including but not limited to hypertension, gingival hyperplasia,myelosuppression, immunosuppression, liver damage, kidney damage, neurotoxicity, lymphoma, and serious infections. The patient understands that monitoring is required including baseline blood pressure, CBC, CMP, lipid panel and uric acid, and then 1-2 times monthly CMP and blood pressure. Hyrimoz Counseling:  I discussed with the patient the risks of adalimumab including but not limited to myelosuppression, immunosuppression, autoimmune hepatitis, demyelinating diseases, lymphoma, and serious infections.  The patient understands that monitoring is required including a PPD at baseline and must alert us or the primary physician if symptoms of infection or other concerning signs are noted. Benzoyl Peroxide Pregnancy And Lactation Text: This medication is Pregnancy Category C. It is unknown if benzoyl peroxide is excreted in breast milk. Spironolactone Counseling: Patient advised regarding risks of diarrhea, abdominal pain, hyperkalemia, birth defects (for female patients), liver toxicity and renal toxicity. The patient may need blood work to monitor liver and kidney function and potassium levels while on therapy. The patient verbalized understanding of the proper use and possible adverse effects of spironolactone.  All of the patient's questions and concerns were addressed. Xelalma deliaz Pregnancy And Lactation Text: This medication is Pregnancy Category D and is not considered safe during pregnancy.  The risk during breast feeding is also uncertain. Solaraze Counseling:  I discussed with the patient the risks of Solaraze including but not limited to erythema, scaling, itching, weeping, crusting, and pain. Litfulo Counseling: I discussed with the patient the risks of Litfulo therapy including but not limited to upper respiratory tract infections, shingles, cold sores, and nausea. Live vaccines should be avoided.  This medication has been linked to serious infections; higher rate of mortality; malignancy and lymphoproliferative disorders; major adverse cardiovascular events; thrombosis; gastrointestinal perforations; neutropenia; lymphopenia; anemia; liver enzyme elevations; and lipid elevations. Otezla Counseling: The side effects of Otezla were discussed with the patient, including but not limited to worsening or new depression, weight loss, diarrhea, nausea, upper respiratory tract infection, and headache. Patient instructed to call the office should any adverse effect occur.  The patient verbalized understanding of the proper use and possible adverse effects of Otezla.  All the patient's questions and concerns were addressed. Opioid Counseling: I discussed with the patient the potential side effects of opioids including but not limited to addiction, altered mental status, and depression. I stressed avoiding alcohol, benzodiazepines, muscle relaxants and sleep aids unless specifically okayed by a physician. The patient verbalized understanding of the proper use and possible adverse effects of opioids. All of the patient's questions and concerns were addressed. They were instructed to flush the remaining pills down the toilet if they did not need them for pain. High Dose Vitamin A Pregnancy And Lactation Text: High dose vitamin A therapy is contraindicated during pregnancy and breast feeding. Ivermectin Counseling:  Patient instructed to take medication on an empty stomach with a full glass of water.  Patient informed of potential adverse effects including but not limited to nausea, diarrhea, dizziness, itching, and swelling of the extremities or lymph nodes.  The patient verbalized understanding of the proper use and possible adverse effects of ivermectin.  All of the patient's questions and concerns were addressed. Cimetidine Counseling:  I discussed with the patient the risks of Cimetidine including but not limited to gynecomastia, headache, diarrhea, nausea, drowsiness, arrhythmias, pancreatitis, skin rashes, psychosis, bone marrow suppression and kidney toxicity. Cosentyx Counseling:  I discussed with the patient the risks of Cosentyx including but not limited to worsening of Crohn's disease, immunosuppression, allergic reactions and infections.  The patient understands that monitoring is required including a PPD at baseline and must alert us or the primary physician if symptoms of infection or other concerning signs are noted. Drysol Pregnancy And Lactation Text: This medication is considered safe during pregnancy and breast feeding. Picato Counseling:  I discussed with the patient the risks of Picato including but not limited to erythema, scaling, itching, weeping, crusting, and pain. Low Dose Naltrexone Pregnancy And Lactation Text: Naltrexone is pregnancy category C.  There have been no adequate and well-controlled studies in pregnant women.  It should be used in pregnancy only if the potential benefit justifies the potential risk to the fetus.   Limited data indicates that naltrexone is minimally excreted into breastmilk. Cyclophosphamide Pregnancy And Lactation Text: This medication is Pregnancy Category D and it isn't considered safe during pregnancy. This medication is excreted in breast milk. Dapsone Counseling: I discussed with the patient the risks of dapsone including but not limited to hemolytic anemia, agranulocytosis, rashes, methemoglobinemia, kidney failure, peripheral neuropathy, headaches, GI upset, and liver toxicity.  Patients who start dapsone require monitoring including baseline LFTs and weekly CBCs for the first month, then every month thereafter.  The patient verbalized understanding of the proper use and possible adverse effects of dapsone.  All of the patient's questions and concerns were addressed. Doxycycline Counseling:  Patient counseled regarding possible photosensitivity and increased risk for sunburn.  Patient instructed to avoid sunlight, if possible.  When exposed to sunlight, patients should wear protective clothing, sunglasses, and sunscreen.  The patient was instructed to call the office immediately if the following severe adverse effects occur:  hearing changes, easy bruising/bleeding, severe headache, or vision changes.  The patient verbalized understanding of the proper use and possible adverse effects of doxycycline.  All of the patient's questions and concerns were addressed. Bactrim Counseling:  I discussed with the patient the risks of sulfa antibiotics including but not limited to GI upset, allergic reaction, drug rash, diarrhea, dizziness, photosensitivity, and yeast infections.  Rarely, more serious reactions can occur including but not limited to aplastic anemia, agranulocytosis, methemoglobinemia, blood dyscrasias, liver or kidney failure, lung infiltrates or desquamative/blistering drug rashes. Wartpeel Counseling:  I discussed with the patient the risks of Wartpeel including but not limited to erythema, scaling, itching, weeping, crusting, and pain. Klisyri Counseling:  I discussed with the patient the risks of Klisyri including but not limited to erythema, scaling, itching, weeping, crusting, and pain. Xeljanz Counseling: I discussed with the patient the risks of Xeljanz therapy including increased risk of infection, liver issues, headache, diarrhea, or cold symptoms. Live vaccines should be avoided. They were instructed to call if they have any problems. Rifampin Counseling: I discussed with the patient the risks of rifampin including but not limited to liver damage, kidney damage, red-orange body fluids, nausea/vomiting and severe allergy. Cibinqo Pregnancy And Lactation Text: It is unknown if this medication will adversely affect pregnancy or breast feeding.  You should not take this medication if you are currently pregnant or planning a pregnancy or while breastfeeding. Griseofulvin Counseling:  I discussed with the patient the risks of griseofulvin including but not limited to photosensitivity, cytopenia, liver damage, nausea/vomiting and severe allergy.  The patient understands that this medication is best absorbed when taken with a fatty meal (e.g., ice cream or french fries). Solaraze Pregnancy And Lactation Text: This medication is Pregnancy Category B and is considered safe. There is some data to suggest avoiding during the third trimester. It is unknown if this medication is excreted in breast milk. Birth Control Pills Pregnancy And Lactation Text: This medication should be avoided if pregnant and for the first 30 days post-partum. Thalidomide Counseling: I discussed with the patient the risks of thalidomide including but not limited to birth defects, anxiety, weakness, chest pain, dizziness, cough and severe allergy. Benzoyl Peroxide Counseling: Patient counseled that medicine may cause skin irritation and bleach clothing.  In the event of skin irritation, the patient was advised to reduce the amount of the drug applied or use it less frequently.   The patient verbalized understanding of the proper use and possible adverse effects of benzoyl peroxide.  All of the patient's questions and concerns were addressed. Topical Ketoconazole Counseling: Patient counseled that this medication may cause skin irritation or allergic reactions.  In the event of skin irritation, the patient was advised to reduce the amount of the drug applied or use it less frequently.   The patient verbalized understanding of the proper use and possible adverse effects of ketoconazole.  All of the patient's questions and concerns were addressed. Carac Counseling:  I discussed with the patient the risks of Carac including but not limited to erythema, scaling, itching, weeping, crusting, and pain. Simponi Counseling:  I discussed with the patient the risks of golimumab including but not limited to myelosuppression, immunosuppression, autoimmune hepatitis, demyelinating diseases, lymphoma, and serious infections.  The patient understands that monitoring is required including a PPD at baseline and must alert us or the primary physician if symptoms of infection or other concerning signs are noted. Low Dose Naltrexone Counseling- I discussed with the patient the potential risks and side effects of low dose naltrexone including but not limited to: more vivid dreams, headaches, nausea, vomiting, abdominal pain, fatigue, dizziness, and anxiety. Elidel Counseling: Patient may experience a mild burning sensation during topical application. Elidel is not approved in children less than 2 years of age. There have been case reports of hematologic and skin malignancies in patients using topical calcineurin inhibitors although causality is questionable. High Dose Vitamin A Counseling: Side effects reviewed, pt to contact office should one occur. Oral Minoxidil Pregnancy And Lactation Text: This medication should only be used when clearly needed if you are pregnant, attempting to become pregnant or breast feeding. Tremfya Counseling: I discussed with the patient the risks of guselkumab including but not limited to immunosuppression, serious infections, and drug reactions.  The patient understands that monitoring is required including a PPD at baseline and must alert us or the primary physician if symptoms of infection or other concerning signs are noted. Azithromycin Pregnancy And Lactation Text: This medication is considered safe during pregnancy and is also secreted in breast milk. Odomzo Counseling- I discussed with the patient the risks of Odomzo including but not limited to nausea, vomiting, diarrhea, constipation, weight loss, changes in the sense of taste, decreased appetite, muscle spasms, and hair loss.  The patient verbalized understanding of the proper use and possible adverse effects of Odomzo.  All of the patient's questions and concerns were addressed. Griseofulvin Pregnancy And Lactation Text: This medication is Pregnancy Category X and is known to cause serious birth defects. It is unknown if this medication is excreted in breast milk but breast feeding should be avoided. Cyclophosphamide Counseling:  I discussed with the patient the risks of cyclophosphamide including but not limited to hair loss, hormonal abnormalities, decreased fertility, abdominal pain, diarrhea, nausea and vomiting, bone marrow suppression and infection. The patient understands that monitoring is required while taking this medication. Birth Control Pills Counseling: Birth Control Pill Counseling: I discussed with the patient the potential side effects of OCPs including but not limited to increased risk of stroke, heart attack, thrombophlebitis, deep venous thrombosis, hepatic adenomas, breast changes, GI upset, headaches, and depression.  The patient verbalized understanding of the proper use and possible adverse effects of OCPs. All of the patient's questions and concerns were addressed. Ilumya Counseling: I discussed with the patient the risks of tildrakizumab including but not limited to immunosuppression, malignancy, posterior leukoencephalopathy syndrome, and serious infections.  The patient understands that monitoring is required including a PPD at baseline and must alert us or the primary physician if symptoms of infection or other concerning signs are noted. Sski Pregnancy And Lactation Text: This medication is Pregnancy Category D and isn't considered safe during pregnancy. It is excreted in breast milk. Klisyri Pregnancy And Lactation Text: It is unknown if this medication can harm a developing fetus or if it is excreted in breast milk. Rifampin Pregnancy And Lactation Text: This medication is Pregnancy Category C and it isn't know if it is safe during pregnancy. It is also excreted in breast milk and should not be used if you are breast feeding. Sotyktu Pregnancy And Lactation Text: There is insufficient data to evaluate whether or not Sotyktu is safe to use during pregnancy.? ?It is not known if Sotyktu passes into breast milk and whether or not it is safe to use when breastfeeding.?? Cibinqo Counseling: I discussed with the patient the risks of Cibinqo therapy including but not limited to common cold, nausea, headache, cold sores, increased blood CPK levels, dizziness, UTIs, fatigue, acne, and vomitting. Live vaccines should be avoided.  This medication has been linked to serious infections; higher rate of mortality; malignancy and lymphoproliferative disorders; major adverse cardiovascular events; thrombosis; thrombocytopenia and lymphopenia; lipid elevations; and retinal detachment. Soolantra Counseling: I discussed with the patients the risks of topial Soolantra. This is a medicine which decreases the number of mites and inflammation in the skin. You experience burning, stinging, eye irritation or allergic reactions.  Please call our office if you develop any problems from using this medication. Oral Minoxidil Counseling- I discussed with the patient the risks of oral minoxidil including but not limited to shortness of breath, swelling of the feet or ankles, dizziness, lightheadedness, unwanted hair growth and allergic reaction.  The patient verbalized understanding of the proper use and possible adverse effects of oral minoxidil.  All of the patient's questions and concerns were addressed. Hydroxychloroquine Pregnancy And Lactation Text: This medication has been shown to cause fetal harm but it isn't assigned a Pregnancy Risk Category. There are small amounts excreted in breast milk. Isotretinoin Pregnancy And Lactation Text: This medication is Pregnancy Category X and is considered extremely dangerous during pregnancy. It is unknown if it is excreted in breast milk. Colchicine Counseling:  Patient counseled regarding adverse effects including but not limited to stomach upset (nausea, vomiting, stomach pain, or diarrhea).  Patient instructed to limit alcohol consumption while taking this medication.  Colchicine may reduce blood counts especially with prolonged use.  The patient understands that monitoring of kidney function and blood counts may be required, especially at baseline. The patient verbalized understanding of the proper use and possible adverse effects of colchicine.  All of the patient's questions and concerns were addressed. Winlevi Counseling:  I discussed with the patient the risks of topical clascoterone including but not limited to erythema, scaling, itching, and stinging. Patient voiced their understanding. Doxepin Counseling:  Patient advised that the medication is sedating and not to drive a car after taking this medication. Patient informed of potential adverse effects including but not limited to dry mouth, urinary retention, and blurry vision.  The patient verbalized understanding of the proper use and possible adverse effects of doxepin.  All of the patient's questions and concerns were addressed. Azithromycin Counseling:  I discussed with the patient the risks of azithromycin including but not limited to GI upset, allergic reaction, drug rash, diarrhea, and yeast infections. Dupixent Counseling: I discussed with the patient the risks of dupilumab including but not limited to eye infection and irritation, cold sores, injection site reactions, worsening of asthma, allergic reactions and increased risk of parasitic infection.  Live vaccines should be avoided while taking dupilumab. Dupilumab will also interact with certain medications such as warfarin and cyclosporine. The patient understands that monitoring is required and they must alert us or the primary physician if symptoms of infection or other concerning signs are noted. Libtayo Pregnancy And Lactation Text: This medication is contraindicated in pregnancy and when breast feeding. Protopic Counseling: Patient may experience a mild burning sensation during topical application. Protopic is not approved in children less than 2 years of age. There have been case reports of hematologic and skin malignancies in patients using topical calcineurin inhibitors although causality is questionable. Zyclara Counseling:  I discussed with the patient the risks of imiquimod including but not limited to erythema, scaling, itching, weeping, crusting, and pain.  Patient understands that the inflammatory response to imiquimod is variable from person to person and was educated regarded proper titration schedule.  If flu-like symptoms develop, patient knows to discontinue the medication and contact us. SSKI Counseling:  I discussed with the patient the risks of SSKI including but not limited to thyroid abnormalities, metallic taste, GI upset, fever, headache, acne, arthralgias, paraesthesias, lymphadenopathy, easy bleeding, arrhythmias, and allergic reaction. Minoxidil Counseling: Minoxidil is a topical medication which can increase blood flow where it is applied. It is uncertain how this medication increases hair growth. Side effects are uncommon and include stinging and allergic reactions. Sotyktu Counseling:  I discussed the most common side effects of Sotyktu including: common cold, sore throat, sinus infections, cold sores, canker sores, folliculitis, and acne.? I also discussed more serious side effects of Sotyktu including but not limited to: serious allergic reactions; increased risk for infections such as TB; cancers such as lymphomas; rhabdomyolysis and elevated CPK; and elevated triglycerides and liver enzymes.? Sarecycline Counseling: Patient advised regarding possible photosensitivity and discoloration of the teeth, skin, lips, tongue and gums.  Patient instructed to avoid sunlight, if possible.  When exposed to sunlight, patients should wear protective clothing, sunglasses, and sunscreen.  The patient was instructed to call the office immediately if the following severe adverse effects occur:  hearing changes, easy bruising/bleeding, severe headache, or vision changes.  The patient verbalized understanding of the proper use and possible adverse effects of sarecycline.  All of the patient's questions and concerns were addressed. Itraconazole Counseling:  I discussed with the patient the risks of itraconazole including but not limited to liver damage, nausea/vomiting, neuropathy, and severe allergy.  The patient understands that this medication is best absorbed when taken with acidic beverages such as non-diet cola or ginger ale.  The patient understands that monitoring is required including baseline LFTs and repeat LFTs at intervals.  The patient understands that they are to contact us or the primary physician if concerning signs are noted. Soolantra Pregnancy And Lactation Text: This medication is Pregnancy Category C. This medication is considered safe during breast feeding. Topical Metronidazole Counseling: Metronidazole is a topical antibiotic medication. You may experience burning, stinging, redness, or allergic reactions.  Please call our office if you develop any problems from using this medication. Adbry Counseling: I discussed with the patient the risks of tralokinumab including but not limited to eye infection and irritation, cold sores, injection site reactions, worsening of asthma, allergic reactions and increased risk of parasitic infection.  Live vaccines should be avoided while taking tralokinumab. The patient understands that monitoring is required and they must alert us or the primary physician if symptoms of infection or other concerning signs are noted. Azelaic Acid Counseling: Patient counseled that medicine may cause skin irritation and to avoid applying near the eyes.  In the event of skin irritation, the patient was advised to reduce the amount of the drug applied or use it less frequently.   The patient verbalized understanding of the proper use and possible adverse effects of azelaic acid.  All of the patient's questions and concerns were addressed. Finasteride Pregnancy And Lactation Text: This medication is absolutely contraindicated during pregnancy. It is unknown if it is excreted in breast milk. Calcipotriene Counseling:  I discussed with the patient the risks of calcipotriene including but not limited to erythema, scaling, itching, and irritation. Isotretinoin Counseling: Patient should get monthly blood tests, not donate blood, not drive at night if vision affected, not share medication, and not undergo elective surgery for 6 months after tx completed. Side effects reviewed, pt to contact office should one occur. Minocycline Counseling: Patient advised regarding possible photosensitivity and discoloration of the teeth, skin, lips, tongue and gums.  Patient instructed to avoid sunlight, if possible.  When exposed to sunlight, patients should wear protective clothing, sunglasses, and sunscreen.  The patient was instructed to call the office immediately if the following severe adverse effects occur:  hearing changes, easy bruising/bleeding, severe headache, or vision changes.  The patient verbalized understanding of the proper use and possible adverse effects of minocycline.  All of the patient's questions and concerns were addressed. Taltz Counseling: I discussed with the patient the risks of ixekizumab including but not limited to immunosuppression, serious infections, worsening of inflammatory bowel disease and drug reactions.  The patient understands that monitoring is required including a PPD at baseline and must alert us or the primary physician if symptoms of infection or other concerning signs are noted. Eucrisa Counseling: Patient may experience a mild burning sensation during topical application. Eucrisa is not approved in children less than 2 years of age. Olanzapine Pregnancy And Lactation Text: This medication is pregnancy category C.   There are no adequate and well controlled trials with olanzapine in pregnant females.  Olanzapine should be used during pregnancy only if the potential benefit justifies the potential risk to the fetus.   In a study in lactating healthy women, olanzapine was excreted in breast milk.  It is recommended that women taking olanzapine should not breast feed. Hydroxychloroquine Counseling:  I discussed with the patient that a baseline ophthalmologic exam is needed at the start of therapy and every year thereafter while on therapy. A CBC may also be warranted for monitoring.  The side effects of this medication were discussed with the patient, including but not limited to agranulocytosis, aplastic anemia, seizures, rashes, retinopathy, and liver toxicity. Patient instructed to call the office should any adverse effect occur.  The patient verbalized understanding of the proper use and possible adverse effects of Plaquenil.  All the patient's questions and concerns were addressed. Doxepin Pregnancy And Lactation Text: This medication is Pregnancy Category C and it isn't known if it is safe during pregnancy. It is also excreted in breast milk and breast feeding isn't recommended. Dupixent Pregnancy And Lactation Text: This medication likely crosses the placenta but the risk for the fetus is uncertain. This medication is excreted in breast milk. Libtayo Counseling- I discussed with the patient the risks of Libtayo including but not limited to nausea, vomiting, diarrhea, and bone or muscle pain.  The patient verbalized understanding of the proper use and possible adverse effects of Libtayo.  All of the patient's questions and concerns were addressed. Winlevi Pregnancy And Lactation Text: This medication is considered safe during pregnancy and breastfeeding. Protopic Pregnancy And Lactation Text: This medication is Pregnancy Category C. It is unknown if this medication is excreted in breast milk when applied topically. Stelara Counseling:  I discussed with the patient the risks of ustekinumab including but not limited to immunosuppression, malignancy, posterior leukoencephalopathy syndrome, and serious infections.  The patient understands that monitoring is required including a PPD at baseline and must alert us or the primary physician if symptoms of infection or other concerning signs are noted. Clindamycin Pregnancy And Lactation Text: This medication can be used in pregnancy if certain situations. Clindamycin is also present in breast milk. Adbry Pregnancy And Lactation Text: It is unknown if this medication will adversely affect pregnancy or breast feeding. Finasteride Female Counseling: Finasteride Counseling:  I discussed with the patient the risks of use of finasteride including but not limited to decreased libido and sexual dysfunction. Explained the teratogenic nature of the medication and stressed the importance of not getting pregnant during treatment. All of the patient's questions and concerns were addressed. Propranolol Pregnancy And Lactation Text: This medication is Pregnancy Category C and it isn't known if it is safe during pregnancy. It is excreted in breast milk. Aklief Pregnancy And Lactation Text: It is unknown if this medication is safe to use during pregnancy.  It is unknown if this medication is excreted in breast milk.  Breastfeeding women should use the topical cream on the smallest area of the skin for the shortest time needed while breastfeeding.  Do not apply to nipple and areola. Infliximab Counseling:  I discussed with the patient the risks of infliximab including but not limited to myelosuppression, immunosuppression, autoimmune hepatitis, demyelinating diseases, lymphoma, and serious infections.  The patient understands that monitoring is required including a PPD at baseline and must alert us or the primary physician if symptoms of infection or other concerning signs are noted. Rinvoq Pregnancy And Lactation Text: Based on animal studies, Rinvoq may cause embryo-fetal harm when administered to pregnant women.  The medication should not be used in pregnancy.  Breastfeeding is not recommended during treatment and for 6 days after the last dose. Topical Retinoid counseling:  Patient advised to apply a pea-sized amount only at bedtime and wait 30 minutes after washing their face before applying.  If too drying, patient may add a non-comedogenic moisturizer. The patient verbalized understanding of the proper use and possible adverse effects of retinoids.  All of the patient's questions and concerns were addressed. Cellcept Counseling:  I discussed with the patient the risks of mycophenolate mofetil including but not limited to infection/immunosuppression, GI upset, hypokalemia, hypercholesterolemia, bone marrow suppression, lymphoproliferative disorders, malignancy, GI ulceration/bleed/perforation, colitis, interstitial lung disease, kidney failure, progressive multifocal leukoencephalopathy, and birth defects.  The patient understands that monitoring is required including a baseline creatinine and regular CBC testing. In addition, patient must alert us immediately if symptoms of infection or other concerning signs are noted. Topical Metronidazole Pregnancy And Lactation Text: This medication is Pregnancy Category B and considered safe during pregnancy.  It is also considered safe to use while breastfeeding. Calcipotriene Pregnancy And Lactation Text: The use of this medication during pregnancy or lactation is not recommended as there is insufficient data. Metronidazole Pregnancy And Lactation Text: This medication is Pregnancy Category B and considered safe during pregnancy.  It is also excreted in breast milk. Olanzapine Counseling- I discussed with the patient the common side effects of olanzapine including but are not limited to: lack of energy, dry mouth, increased appetite, sleepiness, tremor, constipation, dizziness, changes in behavior, or restlessness.  Explained that teenagers are more likely to experience headaches, abdominal pain, pain in the arms or legs, tiredness, and sleepiness.  Serious side effects include but are not limited: increased risk of death in elderly patients who are confused, have memory loss, or dementia-related psychosis; hyperglycemia; increased cholesterol and triglycerides; and weight gain. Bexarotene Pregnancy And Lactation Text: This medication is Pregnancy Category X and should not be given to women who are pregnant or may become pregnant. This medication should not be used if you are breast feeding. Glycopyrrolate Pregnancy And Lactation Text: This medication is Pregnancy Category B and is considered safe during pregnancy. It is unknown if it is excreted breast milk.

## 2024-09-24 ENCOUNTER — TRANSCRIPTION ENCOUNTER (OUTPATIENT)
Age: 24
End: 2024-09-24

## 2024-10-07 ENCOUNTER — TRANSCRIPTION ENCOUNTER (OUTPATIENT)
Age: 24
End: 2024-10-07

## 2024-11-27 ENCOUNTER — TRANSCRIPTION ENCOUNTER (OUTPATIENT)
Age: 24
End: 2024-11-27

## 2024-11-27 DIAGNOSIS — F64.0 TRANSSEXUALISM: ICD-10-CM

## 2024-12-16 ENCOUNTER — APPOINTMENT (OUTPATIENT)
Dept: ENDOCRINOLOGY | Facility: CLINIC | Age: 24
End: 2024-12-16
Payer: COMMERCIAL

## 2024-12-16 VITALS
SYSTOLIC BLOOD PRESSURE: 135 MMHG | DIASTOLIC BLOOD PRESSURE: 65 MMHG | BODY MASS INDEX: 22.76 KG/M2 | WEIGHT: 145 LBS | HEART RATE: 72 BPM | OXYGEN SATURATION: 99 % | HEIGHT: 67 IN

## 2024-12-16 DIAGNOSIS — F64.0 TRANSSEXUALISM: ICD-10-CM

## 2024-12-16 PROCEDURE — 99214 OFFICE O/P EST MOD 30 MIN: CPT

## 2024-12-17 RX ORDER — TESTOSTERONE CYPIONATE 200 MG/ML
200 VIAL (ML) INTRAMUSCULAR
Qty: 4 | Refills: 0 | Status: ACTIVE | COMMUNITY
Start: 2024-12-16

## 2025-01-30 ENCOUNTER — TRANSCRIPTION ENCOUNTER (OUTPATIENT)
Age: 25
End: 2025-01-30

## 2025-02-27 NOTE — ED ADULT TRIAGE NOTE - BP NONINVASIVE SYSTOLIC (MM [facility])
Pt was able to get in on 2/27 with Mariah and also has an med check on 3/14 (2 weeks) with Rizwana   144

## 2025-03-27 ENCOUNTER — TRANSCRIPTION ENCOUNTER (OUTPATIENT)
Age: 25
End: 2025-03-27

## 2025-03-31 ENCOUNTER — TRANSCRIPTION ENCOUNTER (OUTPATIENT)
Age: 25
End: 2025-03-31

## 2025-04-03 ENCOUNTER — TRANSCRIPTION ENCOUNTER (OUTPATIENT)
Age: 25
End: 2025-04-03

## 2025-04-05 ENCOUNTER — TRANSCRIPTION ENCOUNTER (OUTPATIENT)
Age: 25
End: 2025-04-05

## 2025-04-12 ENCOUNTER — TRANSCRIPTION ENCOUNTER (OUTPATIENT)
Age: 25
End: 2025-04-12

## 2025-04-24 ENCOUNTER — APPOINTMENT (OUTPATIENT)
Dept: FAMILY MEDICINE | Facility: CLINIC | Age: 25
End: 2025-04-24
Payer: MEDICAID

## 2025-04-24 VITALS
WEIGHT: 145 LBS | RESPIRATION RATE: 16 BRPM | TEMPERATURE: 98 F | OXYGEN SATURATION: 97 % | HEIGHT: 67 IN | SYSTOLIC BLOOD PRESSURE: 133 MMHG | HEART RATE: 99 BPM | DIASTOLIC BLOOD PRESSURE: 74 MMHG | BODY MASS INDEX: 22.76 KG/M2

## 2025-04-24 DIAGNOSIS — Z00.00 ENCOUNTER FOR GENERAL ADULT MEDICAL EXAMINATION W/OUT ABNORMAL FINDINGS: ICD-10-CM

## 2025-04-24 DIAGNOSIS — F41.8 OTHER SPECIFIED ANXIETY DISORDERS: ICD-10-CM

## 2025-04-24 DIAGNOSIS — R53.81 OTHER MALAISE: ICD-10-CM

## 2025-04-24 DIAGNOSIS — Z01.419 ENCOUNTER FOR GYNECOLOGICAL EXAMINATION (GENERAL) (ROUTINE) W/OUT ABNORMAL FINDINGS: ICD-10-CM

## 2025-04-24 DIAGNOSIS — Z02.0 ENCOUNTER FOR EXAMINATION FOR ADMISSION TO EDUCATIONAL INSTITUTION: ICD-10-CM

## 2025-04-24 LAB
25(OH)D3 SERPL-MCNC: 31.1 NG/ML
ALBUMIN SERPL ELPH-MCNC: 5.2 G/DL
ALP BLD-CCNC: 78 U/L
ALT SERPL-CCNC: 36 U/L
ANION GAP SERPL CALC-SCNC: 16 MMOL/L
AST SERPL-CCNC: 31 U/L
BASOPHILS # BLD AUTO: 0.03 K/UL
BASOPHILS NFR BLD AUTO: 0.5 %
BILIRUB SERPL-MCNC: 0.4 MG/DL
BUN SERPL-MCNC: 17 MG/DL
CALCIUM SERPL-MCNC: 9.7 MG/DL
CHLORIDE SERPL-SCNC: 101 MMOL/L
CHOLEST SERPL-MCNC: 199 MG/DL
CO2 SERPL-SCNC: 24 MMOL/L
CREAT SERPL-MCNC: 0.88 MG/DL
EGFRCR SERPLBLD CKD-EPI 2021: 122 ML/MIN/1.73M2
EOSINOPHIL # BLD AUTO: 0.09 K/UL
EOSINOPHIL NFR BLD AUTO: 1.6 %
FOLATE SERPL-MCNC: 12.8 NG/ML
GLUCOSE SERPL-MCNC: 97 MG/DL
HCT VFR BLD CALC: 51.1 %
HDLC SERPL-MCNC: 53 MG/DL
HGB BLD-MCNC: 16.1 G/DL
IMM GRANULOCYTES NFR BLD AUTO: 0.3 %
LDLC SERPL-MCNC: 135 MG/DL
LYMPHOCYTES # BLD AUTO: 2.22 K/UL
LYMPHOCYTES NFR BLD AUTO: 38.4 %
MAGNESIUM SERPL-MCNC: 2.2 MG/DL
MAN DIFF?: NORMAL
MCHC RBC-ENTMCNC: 28.1 PG
MCHC RBC-ENTMCNC: 31.5 G/DL
MCV RBC AUTO: 89.2 FL
MONOCYTES # BLD AUTO: 0.52 K/UL
MONOCYTES NFR BLD AUTO: 9 %
NEUTROPHILS # BLD AUTO: 2.9 K/UL
NEUTROPHILS NFR BLD AUTO: 50.2 %
NONHDLC SERPL-MCNC: 146 MG/DL
PLATELET # BLD AUTO: 257 K/UL
POTASSIUM SERPL-SCNC: 4.4 MMOL/L
PROT SERPL-MCNC: 8.2 G/DL
RBC # BLD: 5.73 M/UL
RBC # FLD: 12.9 %
SODIUM SERPL-SCNC: 141 MMOL/L
TRIGL SERPL-MCNC: 61 MG/DL
TSH SERPL-ACNC: 2.12 UIU/ML
VIT B12 SERPL-MCNC: 629 PG/ML
WBC # FLD AUTO: 5.78 K/UL

## 2025-04-24 PROCEDURE — 99214 OFFICE O/P EST MOD 30 MIN: CPT | Mod: 25

## 2025-04-25 ENCOUNTER — TRANSCRIPTION ENCOUNTER (OUTPATIENT)
Age: 25
End: 2025-04-25

## 2025-04-25 LAB
ESTIMATED AVERAGE GLUCOSE: 105 MG/DL
HBA1C MFR BLD HPLC: 5.3 %
VZV AB TITR SER: POSITIVE
VZV IGG SER IF-ACNC: 2 S/CO

## 2025-04-27 LAB
M TB IFN-G BLD-IMP: NEGATIVE
QUANTIFERON TB PLUS MITOGEN MINUS NIL: >10 IU/ML
QUANTIFERON TB PLUS NIL: 0.03 IU/ML
QUANTIFERON TB PLUS TB1 MINUS NIL: 0 IU/ML
QUANTIFERON TB PLUS TB2 MINUS NIL: 0 IU/ML

## 2025-04-28 ENCOUNTER — TRANSCRIPTION ENCOUNTER (OUTPATIENT)
Age: 25
End: 2025-04-28

## 2025-05-15 ENCOUNTER — TRANSCRIPTION ENCOUNTER (OUTPATIENT)
Age: 25
End: 2025-05-15

## 2025-05-15 ENCOUNTER — APPOINTMENT (OUTPATIENT)
Dept: FAMILY MEDICINE | Facility: CLINIC | Age: 25
End: 2025-05-15
Payer: MEDICAID

## 2025-05-15 VITALS
HEART RATE: 79 BPM | WEIGHT: 143 LBS | HEIGHT: 67 IN | OXYGEN SATURATION: 99 % | DIASTOLIC BLOOD PRESSURE: 76 MMHG | BODY MASS INDEX: 22.44 KG/M2 | RESPIRATION RATE: 16 BRPM | TEMPERATURE: 98.3 F | SYSTOLIC BLOOD PRESSURE: 129 MMHG

## 2025-05-15 DIAGNOSIS — Z91.018 ALLERGY TO OTHER FOODS: ICD-10-CM

## 2025-05-15 DIAGNOSIS — Z00.00 ENCOUNTER FOR GENERAL ADULT MEDICAL EXAMINATION W/OUT ABNORMAL FINDINGS: ICD-10-CM

## 2025-05-15 DIAGNOSIS — F64.0 TRANSSEXUALISM: ICD-10-CM

## 2025-05-15 PROCEDURE — 99395 PREV VISIT EST AGE 18-39: CPT

## 2025-06-16 ENCOUNTER — APPOINTMENT (OUTPATIENT)
Dept: ENDOCRINOLOGY | Facility: CLINIC | Age: 25
End: 2025-06-16
Payer: MEDICAID

## 2025-06-16 VITALS
BODY MASS INDEX: 22.6 KG/M2 | WEIGHT: 144 LBS | RESPIRATION RATE: 16 BRPM | HEART RATE: 88 BPM | DIASTOLIC BLOOD PRESSURE: 77 MMHG | OXYGEN SATURATION: 99 % | SYSTOLIC BLOOD PRESSURE: 129 MMHG | HEIGHT: 67 IN

## 2025-06-16 PROCEDURE — 99214 OFFICE O/P EST MOD 30 MIN: CPT

## 2025-06-16 PROCEDURE — G2211 COMPLEX E/M VISIT ADD ON: CPT | Mod: NC

## 2025-06-17 LAB
BASOPHILS # BLD AUTO: 0.05 K/UL
BASOPHILS NFR BLD AUTO: 0.6 %
EOSINOPHIL # BLD AUTO: 0.08 K/UL
EOSINOPHIL NFR BLD AUTO: 1 %
HCT VFR BLD CALC: 47.9 %
HGB BLD-MCNC: 15.4 G/DL
IMM GRANULOCYTES NFR BLD AUTO: 0.2 %
LYMPHOCYTES # BLD AUTO: 2.84 K/UL
LYMPHOCYTES NFR BLD AUTO: 34.2 %
MAN DIFF?: NORMAL
MCHC RBC-ENTMCNC: 28.4 PG
MCHC RBC-ENTMCNC: 32.2 G/DL
MCV RBC AUTO: 88.2 FL
MONOCYTES # BLD AUTO: 0.79 K/UL
MONOCYTES NFR BLD AUTO: 9.5 %
NEUTROPHILS # BLD AUTO: 4.52 K/UL
NEUTROPHILS NFR BLD AUTO: 54.5 %
PLATELET # BLD AUTO: 256 K/UL
RBC # BLD: 5.43 M/UL
RBC # FLD: 12.7 %
TESTOST SERPL-MCNC: 728 NG/DL
WBC # FLD AUTO: 8.3 K/UL

## 2025-08-12 ENCOUNTER — TRANSCRIPTION ENCOUNTER (OUTPATIENT)
Age: 25
End: 2025-08-12

## 2025-08-13 ENCOUNTER — TRANSCRIPTION ENCOUNTER (OUTPATIENT)
Age: 25
End: 2025-08-13